# Patient Record
Sex: MALE | Race: WHITE | URBAN - METROPOLITAN AREA
[De-identification: names, ages, dates, MRNs, and addresses within clinical notes are randomized per-mention and may not be internally consistent; named-entity substitution may affect disease eponyms.]

---

## 2017-02-06 ENCOUNTER — MYC MEDICAL ADVICE (OUTPATIENT)
Dept: INTERNAL MEDICINE | Facility: CLINIC | Age: 57
End: 2017-02-06

## 2017-02-06 DIAGNOSIS — N52.2 DRUG-INDUCED ERECTILE DYSFUNCTION: ICD-10-CM

## 2017-02-06 DIAGNOSIS — N40.1 BENIGN NON-NODULAR PROSTATIC HYPERPLASIA WITH LOWER URINARY TRACT SYMPTOMS: Primary | ICD-10-CM

## 2017-02-08 RX ORDER — TADALAFIL 5 MG/1
5 TABLET ORAL DAILY
Qty: 90 TABLET | Refills: 3 | Status: SHIPPED | OUTPATIENT
Start: 2017-02-08 | End: 2017-11-13

## 2017-05-07 ENCOUNTER — MYC MEDICAL ADVICE (OUTPATIENT)
Dept: INTERNAL MEDICINE | Facility: CLINIC | Age: 57
End: 2017-05-07

## 2017-05-07 DIAGNOSIS — F33.42 RECURRENT MAJOR DEPRESSIVE DISORDER, IN FULL REMISSION (H): Primary | ICD-10-CM

## 2017-05-08 RX ORDER — SERTRALINE HYDROCHLORIDE 100 MG/1
200 TABLET, FILM COATED ORAL DAILY
Qty: 180 TABLET | Refills: 1 | Status: SHIPPED | OUTPATIENT
Start: 2017-05-08 | End: 2017-09-08

## 2017-05-09 DIAGNOSIS — N52.2 DRUG-INDUCED ERECTILE DYSFUNCTION: ICD-10-CM

## 2017-05-09 DIAGNOSIS — N40.1 BENIGN NON-NODULAR PROSTATIC HYPERPLASIA WITH LOWER URINARY TRACT SYMPTOMS: ICD-10-CM

## 2017-05-09 RX ORDER — TADALAFIL 5 MG
TABLET ORAL
Qty: 90 TABLET | OUTPATIENT
Start: 2017-05-09

## 2017-08-01 ENCOUNTER — TRANSFERRED RECORDS (OUTPATIENT)
Dept: HEALTH INFORMATION MANAGEMENT | Facility: CLINIC | Age: 57
End: 2017-08-01

## 2017-09-08 ENCOUNTER — OFFICE VISIT (OUTPATIENT)
Dept: INTERNAL MEDICINE | Facility: CLINIC | Age: 57
End: 2017-09-08
Payer: COMMERCIAL

## 2017-09-08 VITALS
TEMPERATURE: 98.5 F | DIASTOLIC BLOOD PRESSURE: 72 MMHG | BODY MASS INDEX: 28.58 KG/M2 | HEIGHT: 72 IN | SYSTOLIC BLOOD PRESSURE: 118 MMHG | WEIGHT: 211 LBS | OXYGEN SATURATION: 97 % | HEART RATE: 100 BPM

## 2017-09-08 DIAGNOSIS — Z00.00 ROUTINE GENERAL MEDICAL EXAMINATION AT A HEALTH CARE FACILITY: Primary | ICD-10-CM

## 2017-09-08 DIAGNOSIS — Z23 NEEDS FLU SHOT: ICD-10-CM

## 2017-09-08 DIAGNOSIS — F33.42 RECURRENT MAJOR DEPRESSIVE DISORDER, IN FULL REMISSION (H): ICD-10-CM

## 2017-09-08 PROCEDURE — 90471 IMMUNIZATION ADMIN: CPT | Performed by: INTERNAL MEDICINE

## 2017-09-08 PROCEDURE — 90686 IIV4 VACC NO PRSV 0.5 ML IM: CPT | Performed by: INTERNAL MEDICINE

## 2017-09-08 PROCEDURE — 99396 PREV VISIT EST AGE 40-64: CPT | Performed by: INTERNAL MEDICINE

## 2017-09-08 RX ORDER — SERTRALINE HYDROCHLORIDE 100 MG/1
200 TABLET, FILM COATED ORAL DAILY
Qty: 180 TABLET | Refills: 3 | Status: SHIPPED | OUTPATIENT
Start: 2017-09-08 | End: 2018-08-28

## 2017-09-08 ASSESSMENT — PATIENT HEALTH QUESTIONNAIRE - PHQ9: SUM OF ALL RESPONSES TO PHQ QUESTIONS 1-9: 0

## 2017-09-08 NOTE — NURSING NOTE
Chief Complaint   Patient presents with     Physical       Initial /72  Pulse 100  Temp 98.5  F (36.9  C) (Oral)  Ht 6' (1.829 m)  Wt 211 lb (95.7 kg)  SpO2 97%  BMI 28.62 kg/m2 Estimated body mass index is 28.62 kg/(m^2) as calculated from the following:    Height as of this encounter: 6' (1.829 m).    Weight as of this encounter: 211 lb (95.7 kg).  Medication Reconciliation: complete   Alejandrina Huynh MA

## 2017-09-08 NOTE — PROGRESS NOTES
SUBJECTIVE:   CC: Tariq Perez is an 56 year old male who presents for preventative health visit.     Healthy Habits:    Do you get at least three servings of calcium containing foods daily (dairy, green leafy vegetables, etc.)? yes    Amount of exercise or daily activities, outside of work: 5 day(s) per week    Problems taking medications regularly No    Medication side effects: No    Have you had an eye exam in the past two years? yes    Do you see a dentist twice per year? Yes, 3 times a year    Do you have sleep apnea, excessive snoring or daytime drowsiness?yes, excessive snoring        PROBLEMS TO ADD ON...  No acute complaints, no medication change or new medical conditions.  Has h/o depression. On medical treatment, controlled, no side effects. No depressive symptoms or suicidal ideation.    Today's PHQ-2 Score: 0  PHQ-2 ( 1999 Pfizer) 9/8/2017 9/16/2016   Q1: Little interest or pleasure in doing things 0 0   Q2: Feeling down, depressed or hopeless 0 0   PHQ-2 Score 0 0   Q1: Little interest or pleasure in doing things - -   Q2: Feeling down, depressed or hopeless - -   PHQ-2 Score - -         Abuse: Current or Past(Physical, Sexual or Emotional)- No  Do you feel safe in your environment - Yes  Social History   Substance Use Topics     Smoking status: Never Smoker     Smokeless tobacco: Never Used     Alcohol use 0.0 oz/week     0 Standard drinks or equivalent per week      Comment: rare occasion     The patient does not drink >3 drinks per day nor >7 drinks per week.    Last PSA:   PSA   Date Value Ref Range Status   09/16/2016 0.86 0 - 4 ug/L Final     Comment:     Assay Method:  Chemiluminescence using Siemens Vista analyzer       Reviewed orders with patient. Reviewed health maintenance and updated orders accordingly - Yes  Labs reviewed in EPIC    Reviewed and updated as needed this visit by clinical staff         Reviewed and updated as needed this visit by Provider              ROS:  C: NEGATIVE  for fever, chills, change in weight  I: NEGATIVE for worrisome rashes, moles or lesions  E: NEGATIVE for vision changes or irritation  ENT: NEGATIVE for ear, mouth and throat problems  R: NEGATIVE for significant cough or SOB  CV: NEGATIVE for chest pain, palpitations or peripheral edema  GI: NEGATIVE for nausea, abdominal pain, heartburn, or change in bowel habits   male: negative for dysuria, hematuria, decreased urinary stream, erectile dysfunction, urethral discharge  M: NEGATIVE for significant arthralgias or myalgia  N: NEGATIVE for weakness, dizziness or paresthesias  P: NEGATIVE for changes in mood or affect    OBJECTIVE:   There were no vitals taken for this visit.  EXAM:  GENERAL: healthy, alert and no distress  EYES: Eyes grossly normal to inspection, PERRL and conjunctivae and sclerae normal  HENT: ear canals and TM's normal, nose and mouth without ulcers or lesions  NECK: no adenopathy, no asymmetry, masses, or scars and thyroid normal to palpation  RESP: lungs clear to auscultation - no rales, rhonchi or wheezes  CV: regular rate and rhythm, normal S1 S2, no S3 or S4, no murmur, click or rub, no peripheral edema and peripheral pulses strong  ABDOMEN: soft, nontender, no hepatosplenomegaly, no masses and bowel sounds normal  MS: no gross musculoskeletal defects noted, no edema  SKIN: no suspicious lesions or rashes  NEURO: Normal strength and tone, mentation intact and speech normal  PSYCH: mentation appears normal, affect normal/bright    ASSESSMENT/PLAN:       ICD-10-CM    1. Routine general medical examination at a health care facility Z00.00 Lipid panel reflex to direct LDL     Prostate spec antigen screen     CBC with platelets     Comprehensive metabolic panel     *UA reflex to Microscopic and Culture ([place] [facility] (except [place] and [place])   2. Recurrent major depressive disorder, in full remission (H) F33.42 sertraline (ZOLOFT) 100 MG tablet       COUNSELING:  Reviewed  "preventive health counseling, as reflected in patient instructions       Regular exercise       Healthy diet/nutrition       Vision screening       Hearing screening       Colon cancer screening       Prostate cancer screening           reports that he has never smoked. He has never used smokeless tobacco.      Estimated body mass index is 27.07 kg/(m^2) as calculated from the following:    Height as of 9/16/16: 6' 0.5\" (1.842 m).    Weight as of 9/16/16: 202 lb 6.4 oz (91.8 kg).   Weight management plan: Discussed healthy diet and exercise guidelines and patient will follow up in 12 months in clinic to re-evaluate.    Counseling Resources:  ATP IV Guidelines  Pooled Cohorts Equation Calculator  FRAX Risk Assessment  ICSI Preventive Guidelines  Dietary Guidelines for Americans, 2010  USDA's MyPlate  ASA Prophylaxis  Lung CA Screening    Saul Peralta MD  [facility]  "

## 2017-09-08 NOTE — MR AVS SNAPSHOT
After Visit Summary   9/8/2017    Tariq Perez    MRN: [id number]           Patient Information     Date Of Birth          1960        Visit Information        Provider Department      9/8/2017 1:40 PM Saul Peralta MD [facility]        Today's Diagnoses     Routine general medical examination at a health care facility    -  1    Recurrent major depressive disorder, in full remission (H)          Care Instructions      Preventive Health Recommendations  Male Ages 50 - 64    Yearly exam:             See your health care provider every year in order to  o   Review health changes.   o   Discuss preventive care.    o   Review your medicines if your doctor has prescribed any.     Have a cholesterol test every 5 years, or more frequently if you are at risk for high cholesterol/heart disease.     Have a diabetes test (fasting glucose) every three years. If you are at risk for diabetes, you should have this test more often.     Have a colonoscopy at age 50, or have a yearly FIT test (stool test). These exams will check for colon cancer.      Talk with your health care provider about whether or not a prostate cancer screening test (PSA) is right for you.    You should be tested each year for STDs (sexually transmitted diseases), if you re at risk.     Shots: Get a flu shot each year. Get a tetanus shot every 10 years.     Nutrition:    Eat at least 5 servings of fruits and vegetables daily.     Eat whole-grain bread, whole-wheat pasta and brown rice instead of white grains and rice.     Talk to your provider about Calcium and Vitamin D.     Lifestyle    Exercise for at least 150 minutes a week (30 minutes a day, 5 days a week). This will help you control your weight and prevent disease.     Limit alcohol to one drink per day.     No smoking.     Wear sunscreen to prevent skin cancer.     See your dentist every six months for an exam and cleaning.     See your eye doctor every 1 to  2 years.            Follow-ups after your visit        Future tests that were ordered for you today     Open Future Orders        Priority Expected Expires Ordered    Lipid panel reflex to direct LDL Routine  11/8/2017 9/8/2017    Prostate spec antigen screen Routine  11/8/2017 9/8/2017    CBC with platelets Routine  11/8/2017 9/8/2017    Comprehensive metabolic panel Routine  11/8/2017 9/8/2017    *UA reflex to Microscopic and Culture ([place] [facility] (except [place] and [place]) Routine  11/8/2017 9/8/2017            Who to contact     If you have questions or need follow up information about today's clinic visit or your schedule please contact [facility] directly at [phone].  Normal or non-critical lab and imaging results will be communicated to you by Freak'[employer] Geniushart, letter or phone within 4 business days after the clinic has received the results. If you do not hear from us within 7 days, please contact the clinic through [employer]iogenixt or phone. If you have a critical or abnormal lab result, we will notify you by phone as soon as possible.  Submit refill requests through Headplay or call your pharmacy and they will forward the refill request to us. Please allow 3 business days for your refill to be completed.          Additional Information About Your Visit        Fre[employer]iushar[employer] Information     Headplay gives you secure access to your electronic health record. If you see a primary care provider, you can also send messages to your care team and make appointments. If you have questions, please call your primary care clinic.  If you do not have a primary care provider, please call [phone] and they will assist you.        Care EveryWhere ID     This is your Care EveryWhere ID. This could be used by other organizations to access your [facility] medical records  [id number]        Your Vitals Were     Pulse Temperature Height Pulse Oximetry BMI (Body Mass Index)       100 98.5  F (36.9  C) (Oral) 6'  (1.829 m) 97% 28.62 kg/m2        Blood Pressure from Last 3 Encounters:   09/08/17 118/72   09/16/16 102/78   09/04/15 116/73    Weight from Last 3 Encounters:   09/08/17 211 lb (95.7 kg)   09/16/16 202 lb 6.4 oz (91.8 kg)   09/04/15 195 lb (88.5 kg)                 Where to get your medicines      These medications were sent to [email or web address] - [address]  [address] [facility] [address]     Phone:  [phone]     sertraline 100 MG tablet          Primary Care Provider Office Phone # Fax #    Saul Peralta [phone]       [address] [facility] [address]        Equal Access to Services     [facility] : Hadii viviana colon hadasho Solydia, waaxda luqadaha, qaybta kaalmada adejohn, jolene díaz . So [facility] [phone].    ATENCIÓN: Si habla español, tiene a mishra disposición servicios gratuitos de asistencia lingüística. Kerry al [phone].    We comply with applicable federal civil rights laws and [place] laws. We do not discriminate on the basis of race, color, national origin, age, disability sex, sexual orientation or gender identity.            Thank you!     Thank you for choosing [facility]  for your care. Our goal is always to provide you with excellent care. Hearing back from our patients is one way we can continue to improve our services. Please take a few minutes to complete the written survey that you may receive in the mail after your visit with us. Thank you!             Your Updated Medication List - Protect others around you: Learn how to safely use, store and throw away your medicines at www.disposemymeds.org.          This list is accurate as of: 9/8/17  2:03 PM.  Always use your most recent med list.                   Brand Name Dispense Instructions for use Diagnosis    ADVIL 200 MG tablet   Generic drug:  ibuprofen      2 TABS PRN        sertraline 100 MG tablet     ZOLOFT    180 tablet    Take 2 tablets (200 mg) by mouth daily    Recurrent major depressive disorder, in full remission (H)       tadalafil 5 MG tablet    CIALIS    90 tablet    Take 1 tablet (5 mg) by mouth daily Never use with nitroglycerin, terazosin or doxazosin.    Benign non-nodular prostatic hyperplasia with lower urinary tract symptoms, Drug-induced erectile dysfunction

## 2017-09-08 NOTE — NURSING NOTE

## 2017-09-30 DIAGNOSIS — Z00.00 ROUTINE GENERAL MEDICAL EXAMINATION AT A HEALTH CARE FACILITY: ICD-10-CM

## 2017-09-30 LAB
ALBUMIN SERPL-MCNC: 3.8 G/DL (ref 3.4–5)
ALBUMIN UR-MCNC: NEGATIVE MG/DL
ALP SERPL-CCNC: 74 U/L (ref 40–150)
ALT SERPL W P-5'-P-CCNC: 27 U/L (ref 0–70)
ANION GAP SERPL CALCULATED.3IONS-SCNC: 6 MMOL/L (ref 3–14)
APPEARANCE UR: CLEAR
AST SERPL W P-5'-P-CCNC: 11 U/L (ref 0–45)
BILIRUB SERPL-MCNC: 0.6 MG/DL (ref 0.2–1.3)
BILIRUB UR QL STRIP: NEGATIVE
BUN SERPL-MCNC: 17 MG/DL (ref 7–30)
CALCIUM SERPL-MCNC: 8.9 MG/DL (ref 8.5–10.1)
CHLORIDE SERPL-SCNC: 104 MMOL/L (ref 94–109)
CHOLEST SERPL-MCNC: 207 MG/DL
CO2 SERPL-SCNC: 29 MMOL/L (ref 20–32)
COLOR UR AUTO: YELLOW
CREAT SERPL-MCNC: 1.06 MG/DL (ref 0.66–1.25)
ERYTHROCYTE [DISTWIDTH] IN BLOOD BY AUTOMATED COUNT: 12.7 % (ref 10–15)
GFR SERPL CREATININE-BSD FRML MDRD: 72 ML/MIN/1.7M2
GLUCOSE SERPL-MCNC: 87 MG/DL (ref 70–99)
GLUCOSE UR STRIP-MCNC: NEGATIVE MG/DL
HCT VFR BLD AUTO: 44 % (ref 40–53)
HDLC SERPL-MCNC: 78 MG/DL
HGB BLD-MCNC: 15.2 G/DL (ref 13.3–17.7)
HGB UR QL STRIP: NEGATIVE
KETONES UR STRIP-MCNC: NEGATIVE MG/DL
LDLC SERPL CALC-MCNC: 117 MG/DL
LEUKOCYTE ESTERASE UR QL STRIP: NEGATIVE
MCH RBC QN AUTO: 31.9 PG (ref 26.5–33)
MCHC RBC AUTO-ENTMCNC: 34.5 G/DL (ref 31.5–36.5)
MCV RBC AUTO: 92 FL (ref 78–100)
NITRATE UR QL: NEGATIVE
NONHDLC SERPL-MCNC: 129 MG/DL
PH UR STRIP: 5 PH (ref 5–7)
PLATELET # BLD AUTO: 175 10E9/L (ref 150–450)
POTASSIUM SERPL-SCNC: 4 MMOL/L (ref 3.4–5.3)
PROT SERPL-MCNC: 7.4 G/DL (ref 6.8–8.8)
PSA SERPL-ACNC: 0.74 UG/L (ref 0–4)
RBC # BLD AUTO: 4.76 10E12/L (ref 4.4–5.9)
SODIUM SERPL-SCNC: 139 MMOL/L (ref 133–144)
SOURCE: NORMAL
SP GR UR STRIP: <=1.005 (ref 1–1.03)
TRIGL SERPL-MCNC: 62 MG/DL
UROBILINOGEN UR STRIP-ACNC: 0.2 EU/DL (ref 0.2–1)
WBC # BLD AUTO: 4.4 10E9/L (ref 4–11)

## 2017-09-30 PROCEDURE — 80053 COMPREHEN METABOLIC PANEL: CPT | Performed by: INTERNAL MEDICINE

## 2017-09-30 PROCEDURE — 80061 LIPID PANEL: CPT | Performed by: INTERNAL MEDICINE

## 2017-09-30 PROCEDURE — 85027 COMPLETE CBC AUTOMATED: CPT | Performed by: INTERNAL MEDICINE

## 2017-09-30 PROCEDURE — 36415 COLL VENOUS BLD VENIPUNCTURE: CPT | Performed by: INTERNAL MEDICINE

## 2017-09-30 PROCEDURE — 81003 URINALYSIS AUTO W/O SCOPE: CPT | Performed by: INTERNAL MEDICINE

## 2017-09-30 PROCEDURE — G0103 PSA SCREENING: HCPCS | Performed by: INTERNAL MEDICINE

## 2017-11-13 ENCOUNTER — MYC MEDICAL ADVICE (OUTPATIENT)
Dept: INTERNAL MEDICINE | Facility: CLINIC | Age: 57
End: 2017-11-13

## 2017-11-13 DIAGNOSIS — N40.1 BENIGN NON-NODULAR PROSTATIC HYPERPLASIA WITH LOWER URINARY TRACT SYMPTOMS: ICD-10-CM

## 2017-11-13 DIAGNOSIS — N52.2 DRUG-INDUCED ERECTILE DYSFUNCTION: ICD-10-CM

## 2017-11-13 RX ORDER — TADALAFIL 5 MG/1
5 TABLET ORAL DAILY
Qty: 90 TABLET | Refills: 2 | Status: SHIPPED | OUTPATIENT
Start: 2017-11-13 | End: 2018-03-19

## 2017-11-15 ENCOUNTER — MYC MEDICAL ADVICE (OUTPATIENT)
Dept: INTERNAL MEDICINE | Facility: CLINIC | Age: 57
End: 2017-11-15

## 2017-11-20 NOTE — TELEPHONE ENCOUNTER
States he is currently getting a 24 day supply with refills but states this is not cost effective. States with the prior auth he is able to get a 90 day supply which is much more cost effective. States he is taking the medication for both ED and bladder issues and this is the only medication that does this so insurance will pay for it. Tried and failed one other medication but does not remember the name. Pt will try to find that info and send a MyChart with info.

## 2017-11-22 NOTE — TELEPHONE ENCOUNTER
PA form completed, MD signed and faxed over.   Will keep encounter open for an update.  Alejandrina Huynh MA

## 2017-11-27 ENCOUNTER — MYC MEDICAL ADVICE (OUTPATIENT)
Dept: INTERNAL MEDICINE | Facility: CLINIC | Age: 57
End: 2017-11-27

## 2017-12-06 ENCOUNTER — MYC MEDICAL ADVICE (OUTPATIENT)
Dept: INTERNAL MEDICINE | Facility: CLINIC | Age: 57
End: 2017-12-06

## 2017-12-07 NOTE — TELEPHONE ENCOUNTER
Message sent to pt advising that we did send in a prescription to [facility]-#90 tabs with 2 refills on 11/13/17. Advised pt to contact [facility] and ask them to re-check on this and to contact us if there is still an issue.

## 2018-03-15 ENCOUNTER — TELEPHONE (OUTPATIENT)
Dept: INTERNAL MEDICINE | Facility: CLINIC | Age: 58
End: 2018-03-15

## 2018-03-15 DIAGNOSIS — N52.2 DRUG-INDUCED ERECTILE DYSFUNCTION: ICD-10-CM

## 2018-03-15 DIAGNOSIS — N40.1 BENIGN NON-NODULAR PROSTATIC HYPERPLASIA WITH LOWER URINARY TRACT SYMPTOMS: ICD-10-CM

## 2018-03-15 NOTE — TELEPHONE ENCOUNTER
Central Prior Authorization Team   Phone: [phone]      PA Not Needed    Medication: tadalafil (CIALIS) 5 MG tablet - PA not needed  Insurance Company: [employer] - Phone [phone] Fax [phone]  Pharmacy Filling the Rx: [facility] - [place], [place] - [address] [place] [address]  Start Date: 3/15/2018    Tried to initiate quantity limit exception by phone and was told it was already approved. The did run a test claim for the 90day supply with a quantity of 90 that the patient wanted and it did go through. Then called and spoke with pharmacy and the script on file is only written for a 24day supply. They need a new prescription written for a quantity of 90. Thanks!

## 2018-03-19 RX ORDER — TADALAFIL 5 MG/1
5 TABLET ORAL DAILY
Qty: 90 TABLET | Refills: 1 | Status: SHIPPED | OUTPATIENT
Start: 2018-03-19 | End: 2018-08-28

## 2018-08-26 DIAGNOSIS — N40.1 BENIGN NON-NODULAR PROSTATIC HYPERPLASIA WITH LOWER URINARY TRACT SYMPTOMS: ICD-10-CM

## 2018-08-26 DIAGNOSIS — N52.2 DRUG-INDUCED ERECTILE DYSFUNCTION: ICD-10-CM

## 2018-08-26 RX ORDER — TADALAFIL 5 MG
TABLET ORAL
Qty: 90 TABLET | Refills: 1 | Status: CANCELLED | OUTPATIENT
Start: 2018-08-26

## 2018-08-27 NOTE — TELEPHONE ENCOUNTER
"Requested Prescriptions   Pending Prescriptions Disp Refills     CIALIS 5 MG tablet [Pharmacy Med Name: CIALIS TABS 5MG] 90 tablet 1    Last Written Prescription Date:  03/19/2018  Last Fill Quantity: 90,  # refills: 1   Last office visit: 9/8/2017 with prescribing provider:     Future Office Visit:   Sig: TAKE 1 TABLET DAILY (NEVER USE WITH NITROGLYCERIN, TERAZOSIN OR DOXAZOSIN)    Erectile Dysfuction Protocol Passed    8/26/2018  6:31 AM       Passed - Absence of nitrates on medication list       Passed - Absence of Alpha Blockers on Med list       Passed - Recent (12 mo) or future (30 days) visit within the authorizing provider's specialty    Patient had office visit in the last 12 months or has a visit in the next 30 days with authorizing provider or within the authorizing provider's specialty.  See \"Patient Info\" tab in inbasket, or \"Choose Columns\" in Meds & Orders section of the refill encounter.           Passed - Patient is age 18 or older        "

## 2018-08-28 ENCOUNTER — OFFICE VISIT (OUTPATIENT)
Dept: INTERNAL MEDICINE | Facility: CLINIC | Age: 58
End: 2018-08-28
Payer: COMMERCIAL

## 2018-08-28 VITALS
HEIGHT: 72 IN | DIASTOLIC BLOOD PRESSURE: 80 MMHG | WEIGHT: 214.4 LBS | TEMPERATURE: 98.2 F | SYSTOLIC BLOOD PRESSURE: 118 MMHG | BODY MASS INDEX: 29.04 KG/M2 | RESPIRATION RATE: 20 BRPM | HEART RATE: 88 BPM | OXYGEN SATURATION: 97 %

## 2018-08-28 DIAGNOSIS — F33.42 RECURRENT MAJOR DEPRESSIVE DISORDER, IN FULL REMISSION (H): ICD-10-CM

## 2018-08-28 DIAGNOSIS — N40.1 BENIGN NON-NODULAR PROSTATIC HYPERPLASIA WITH LOWER URINARY TRACT SYMPTOMS: ICD-10-CM

## 2018-08-28 DIAGNOSIS — Z00.00 ROUTINE GENERAL MEDICAL EXAMINATION AT A HEALTH CARE FACILITY: Primary | ICD-10-CM

## 2018-08-28 DIAGNOSIS — Z12.5 SCREENING FOR PROSTATE CANCER: ICD-10-CM

## 2018-08-28 DIAGNOSIS — N52.2 DRUG-INDUCED ERECTILE DYSFUNCTION: ICD-10-CM

## 2018-08-28 LAB
ALBUMIN SERPL-MCNC: 3.8 G/DL (ref 3.4–5)
ALBUMIN UR-MCNC: NEGATIVE MG/DL
ALP SERPL-CCNC: 75 U/L (ref 40–150)
ALT SERPL W P-5'-P-CCNC: 29 U/L (ref 0–70)
ANION GAP SERPL CALCULATED.3IONS-SCNC: 1 MMOL/L (ref 3–14)
APPEARANCE UR: CLEAR
AST SERPL W P-5'-P-CCNC: 20 U/L (ref 0–45)
BILIRUB SERPL-MCNC: 0.5 MG/DL (ref 0.2–1.3)
BILIRUB UR QL STRIP: NEGATIVE
BUN SERPL-MCNC: 22 MG/DL (ref 7–30)
CALCIUM SERPL-MCNC: 8.7 MG/DL (ref 8.5–10.1)
CHLORIDE SERPL-SCNC: 105 MMOL/L (ref 94–109)
CHOLEST SERPL-MCNC: 216 MG/DL
CO2 SERPL-SCNC: 33 MMOL/L (ref 20–32)
COLOR UR AUTO: YELLOW
CREAT SERPL-MCNC: 1.14 MG/DL (ref 0.66–1.25)
ERYTHROCYTE [DISTWIDTH] IN BLOOD BY AUTOMATED COUNT: 12.6 % (ref 10–15)
GFR SERPL CREATININE-BSD FRML MDRD: 66 ML/MIN/1.7M2
GLUCOSE SERPL-MCNC: 84 MG/DL (ref 70–99)
GLUCOSE UR STRIP-MCNC: NEGATIVE MG/DL
HCT VFR BLD AUTO: 44 % (ref 40–53)
HDLC SERPL-MCNC: 69 MG/DL
HGB BLD-MCNC: 14.9 G/DL (ref 13.3–17.7)
HGB UR QL STRIP: NEGATIVE
KETONES UR STRIP-MCNC: NEGATIVE MG/DL
LDLC SERPL CALC-MCNC: 138 MG/DL
LEUKOCYTE ESTERASE UR QL STRIP: NEGATIVE
MCH RBC QN AUTO: 31.7 PG (ref 26.5–33)
MCHC RBC AUTO-ENTMCNC: 33.9 G/DL (ref 31.5–36.5)
MCV RBC AUTO: 94 FL (ref 78–100)
NITRATE UR QL: NEGATIVE
NONHDLC SERPL-MCNC: 147 MG/DL
PH UR STRIP: 6.5 PH (ref 5–7)
PLATELET # BLD AUTO: 176 10E9/L (ref 150–450)
POTASSIUM SERPL-SCNC: 4.1 MMOL/L (ref 3.4–5.3)
PROT SERPL-MCNC: 7.2 G/DL (ref 6.8–8.8)
PSA SERPL-ACNC: 0.83 UG/L (ref 0–4)
RBC # BLD AUTO: 4.7 10E12/L (ref 4.4–5.9)
SODIUM SERPL-SCNC: 139 MMOL/L (ref 133–144)
SOURCE: NORMAL
SP GR UR STRIP: 1.01 (ref 1–1.03)
TRIGL SERPL-MCNC: 43 MG/DL
TSH SERPL DL<=0.005 MIU/L-ACNC: 1.5 MU/L (ref 0.4–4)
UROBILINOGEN UR STRIP-ACNC: 0.2 EU/DL (ref 0.2–1)
WBC # BLD AUTO: 4.4 10E9/L (ref 4–11)

## 2018-08-28 PROCEDURE — G0103 PSA SCREENING: HCPCS | Performed by: INTERNAL MEDICINE

## 2018-08-28 PROCEDURE — 85027 COMPLETE CBC AUTOMATED: CPT | Performed by: INTERNAL MEDICINE

## 2018-08-28 PROCEDURE — 84443 ASSAY THYROID STIM HORMONE: CPT | Performed by: INTERNAL MEDICINE

## 2018-08-28 PROCEDURE — 80061 LIPID PANEL: CPT | Performed by: INTERNAL MEDICINE

## 2018-08-28 PROCEDURE — 99396 PREV VISIT EST AGE 40-64: CPT | Performed by: INTERNAL MEDICINE

## 2018-08-28 PROCEDURE — 81003 URINALYSIS AUTO W/O SCOPE: CPT | Performed by: INTERNAL MEDICINE

## 2018-08-28 PROCEDURE — 80053 COMPREHEN METABOLIC PANEL: CPT | Performed by: INTERNAL MEDICINE

## 2018-08-28 PROCEDURE — 36415 COLL VENOUS BLD VENIPUNCTURE: CPT | Performed by: INTERNAL MEDICINE

## 2018-08-28 RX ORDER — SERTRALINE HYDROCHLORIDE 100 MG/1
150 TABLET, FILM COATED ORAL DAILY
Qty: 135 TABLET | Refills: 3 | Status: SHIPPED | OUTPATIENT
Start: 2018-08-28 | End: 2018-12-10

## 2018-08-28 RX ORDER — TADALAFIL 5 MG/1
5 TABLET ORAL DAILY
Qty: 90 TABLET | Refills: 3 | Status: SHIPPED | OUTPATIENT
Start: 2018-08-28 | End: 2019-10-21

## 2018-08-28 NOTE — PROGRESS NOTES
SUBJECTIVE:   CC: Tariq Perez is an 57 year old male who presents for preventative health visit.     Healthy Habits:    Do you get at least three servings of calcium containing foods daily (dairy, green leafy vegetables, etc.)? yes    Amount of exercise or daily activities, outside of work: 3-5 day(s) per week    Problems taking medications regularly No    Medication side effects: Yes, sertraline.    Have you had an eye exam in the past two years? yes    Do you see a dentist twice per year? yes    Do you have sleep apnea, excessive snoring or daytime drowsiness?no       Wants blood test to includes the liver.    Today's PHQ-2 Score:   PHQ-2 ( 1999 Pfizer) 8/28/2018 9/8/2017   Q1: Little interest or pleasure in doing things 0 0   Q2: Feeling down, depressed or hopeless 0 0   PHQ-2 Score 0 0   Q1: Little interest or pleasure in doing things - -   Q2: Feeling down, depressed or hopeless - -   PHQ-2 Score - -       Abuse: Current or Past(Physical, Sexual or Emotional)- No  Do you feel safe in your environment - Yes    Social History   Substance Use Topics     Smoking status: Never Smoker     Smokeless tobacco: Never Used     Alcohol use 0.0 oz/week     0 Standard drinks or equivalent per week      Comment: rare occasion      If you drink alcohol do you typically have >3 drinks per day or >7 drinks per week? Yes - AUDIT SCORE:     Occasional.                      Last PSA:   PSA   Date Value Ref Range Status   09/30/2017 0.74 0 - 4 ug/L Final     Comment:     Assay Method:  Chemiluminescence using Siemens Vista analyzer       Reviewed orders with patient. Reviewed health maintenance and updated orders accordingly - Yes  Labs reviewed in EPIC  BP Readings from Last 3 Encounters:   08/28/18 118/80   09/08/17 118/72   09/16/16 102/78    Wt Readings from Last 3 Encounters:   08/28/18 214 lb 6.4 oz (97.3 kg)   09/08/17 211 lb (95.7 kg)   09/16/16 202 lb 6.4 oz (91.8 kg)                  Patient Active Problem List    Diagnosis     CARDIOVASCULAR SCREENING; LDL GOAL LESS THAN 160     GERD (gastroesophageal reflux disease)     Recurrent major depressive disorder, in full remission (H)     Past Surgical History:   Procedure Laterality Date     COLONOSCOPY  3/3/2012    Procedure:COLONOSCOPY; COLONOSCOPY; Surgeon:AL DIXON; Location:[facility]     DECOMPRESSION LUMBAR ONE LEVEL       TONSILLECTOMY         Social History   Substance Use Topics     Smoking status: Never Smoker     Smokeless tobacco: Never Used     Alcohol use 0.0 oz/week     0 Standard drinks or equivalent per week      Comment: rare occasion     Family History   Problem Relation Age of Onset     GASTROINTESTINAL DISEASE Mother      b:1931 gastric reflux     Depression Father      d:age 76 manic depression     Depression Sister      b:1964         Current Outpatient Prescriptions   Medication Sig Dispense Refill     ADVIL 200 MG OR TABS 2 TABS PRN       sertraline (ZOLOFT) 100 MG tablet Take 1.5 tablets (150 mg) by mouth daily 135 tablet 3     tadalafil (CIALIS) 5 MG tablet Take 1 tablet (5 mg) by mouth daily Never use with nitroglycerin, terazosin or doxazosin. 90 tablet 3     [DISCONTINUED] sertraline (ZOLOFT) 100 MG tablet Take 2 tablets (200 mg) by mouth daily 180 tablet 3     [DISCONTINUED] tadalafil (CIALIS) 5 MG tablet Take 1 tablet (5 mg) by mouth daily Never use with nitroglycerin, terazosin or doxazosin. 90 tablet 1       Reviewed and updated as needed this visit by clinical staff  Tobacco  Allergies  Meds  Med Hx  Surg Hx  Fam Hx  Soc Hx        Reviewed and updated as needed this visit by Provider        Past Medical History:   Diagnosis Date     Depression       Has h/o depression. On medical treatment, controlled, no side effects. No depressive symptoms or suicidal ideation.    Has H/O ED and BPH. On treatment with Cialis . No  symptoms of frequency, decreased urinary stream or dysuria. No side effects from medications.    ROS:  CONSTITUTIONAL:  NEGATIVE for fever, chills, change in weight  INTEGUMENTARY/SKIN: NEGATIVE for worrisome rashes, moles or lesions  EYES: NEGATIVE for vision changes or irritation  ENT: NEGATIVE for ear, mouth and throat problems  RESP: NEGATIVE for significant cough or SOB  CV: NEGATIVE for chest pain, palpitations or peripheral edema  GI: NEGATIVE for nausea, abdominal pain, heartburn, or change in bowel habits   male: negative for dysuria, hematuria, decreased urinary stream, erectile dysfunction, urethral discharge  MUSCULOSKELETAL: NEGATIVE for significant arthralgias or myalgia  NEURO: NEGATIVE for weakness, dizziness or paresthesias  PSYCHIATRIC: NEGATIVE for changes in mood or affect    OBJECTIVE:   /80 (BP Location: Left arm, Patient Position: Sitting, Cuff Size: Adult Regular)  Pulse 88  Temp 98.2  F (36.8  C) (Oral)  Resp 20  Ht 6' (1.829 m)  Wt 214 lb 6.4 oz (97.3 kg)  SpO2 97%  BMI 29.08 kg/m2  EXAM:  GENERAL: healthy, alert and no distress  EYES: Eyes grossly normal to inspection, PERRL and conjunctivae and sclerae normal  HENT: ear canals and TM's normal, nose and mouth without ulcers or lesions  NECK: no adenopathy, no asymmetry, masses, or scars and thyroid normal to palpation  RESP: lungs clear to auscultation - no rales, rhonchi or wheezes  CV: regular rate and rhythm, normal S1 S2, no S3 or S4, no murmur, click or rub, no peripheral edema and peripheral pulses strong  ABDOMEN: soft, nontender, no hepatosplenomegaly, no masses and bowel sounds normal  MS: no gross musculoskeletal defects noted, no edema  SKIN: no suspicious lesions or rashes  NEURO: Normal strength and tone, mentation intact and speech normal  PSYCH: mentation appears normal, affect normal/bright    Diagnostic Test Results:  none     ASSESSMENT/PLAN:       ICD-10-CM    1. Routine general medical examination at a health care facility Z00.00 CBC with platelets     Comprehensive metabolic panel     Lipid panel reflex to direct LDL  Fasting     Prostate spec antigen screen     TSH with free T4 reflex     *UA reflex to Microscopic   2. Benign non-nodular prostatic hyperplasia with lower urinary tract symptoms N40.1 tadalafil (CIALIS) 5 MG tablet   3. Drug-induced erectile dysfunction N52.2 tadalafil (CIALIS) 5 MG tablet   4. Recurrent major depressive disorder, in full remission (H) F33.42 sertraline (ZOLOFT) 100 MG tablet   5. Screening for prostate cancer Z12.5 Prostate spec antigen screen       COUNSELING:  Reviewed preventive health counseling, as reflected in patient instructions       Regular exercise       Healthy diet/nutrition       Vision screening       Hearing screening       Colon cancer screening       Prostate cancer screening    BP Readings from Last 1 Encounters:   08/28/18 118/80     Estimated body mass index is 29.08 kg/(m^2) as calculated from the following:    Height as of this encounter: 6' (1.829 m).    Weight as of this encounter: 214 lb 6.4 oz (97.3 kg).      Decrease Sertraline to 150 mg and if tolerated can change to 100 mg in 3 months      reports that he has never smoked. He has never used smokeless tobacco.      Counseling Resources:  ATP IV Guidelines  Pooled Cohorts Equation Calculator  FRAX Risk Assessment  ICSI Preventive Guidelines  Dietary Guidelines for Americans, 2010  USDA's MyPlate  ASA Prophylaxis  Lung CA Screening    Saul Peralta MD  [facility]

## 2018-08-28 NOTE — MR AVS SNAPSHOT
After Visit Summary   8/28/2018    Tariq Perez    MRN: [id number]           Patient Information     Date Of Birth          1960        Visit Information        Provider Department      8/28/2018 8:20 AM Saul Peralta MD [facility]        Today's Diagnoses     Routine general medical examination at a health care facility    -  1    Benign non-nodular prostatic hyperplasia with lower urinary tract symptoms        Drug-induced erectile dysfunction        Recurrent major depressive disorder, in full remission (H)        Screening for prostate cancer          Care Instructions      Preventive Health Recommendations  Male Ages 50 - 64    Yearly exam:             See your health care provider every year in order to  o   Review health changes.   o   Discuss preventive care.    o   Review your medicines if your doctor has prescribed any.     Have a cholesterol test every 5 years, or more frequently if you are at risk for high cholesterol/heart disease.     Have a diabetes test (fasting glucose) every three years. If you are at risk for diabetes, you should have this test more often.     Have a colonoscopy at age 50, or have a yearly FIT test (stool test). These exams will check for colon cancer.      Talk with your health care provider about whether or not a prostate cancer screening test (PSA) is right for you.    You should be tested each year for STDs (sexually transmitted diseases), if you re at risk.     Shots: Get a flu shot each year. Get a tetanus shot every 10 years.     Nutrition:    Eat at least 5 servings of fruits and vegetables daily.     Eat whole-grain bread, whole-wheat pasta and brown rice instead of white grains and rice.     Get adequate Calcium and Vitamin D.     Lifestyle    Exercise for at least 150 minutes a week (30 minutes a day, 5 days a week). This will help you control your weight and prevent disease.     Limit alcohol to one drink per day.     No  smoking.     Wear sunscreen to prevent skin cancer.     See your dentist every six months for an exam and cleaning.     See your eye doctor every 1 to 2 years.            Follow-ups after your visit        Who to contact     If you have questions or need follow up information about today's clinic visit or your schedule please contact [facility] directly at [phone].  Normal or non-critical lab and imaging results will be communicated to you by MyChart, letter or phone within 4 business days after the clinic has received the results. If you do not hear from us within 7 days, please contact the clinic through Mocanat or phone. If you have a critical or abnormal lab result, we will notify you by phone as soon as possible.  Submit refill requests through Cloopen or call your pharmacy and they will forward the refill request to us. Please allow 3 business days for your refill to be completed.          Additional Information About Your Visit        The 3DoodlerharEasy Voyage Information     Cloopen gives you secure access to your electronic health record. If you see a primary care provider, you can also send messages to your care team and make appointments. If you have questions, please call your primary care clinic.  If you do not have a primary care provider, please call [phone] and they will assist you.        Care EveryWhere ID     This is your Care EveryWhere ID. This could be used by other organizations to access your [facility] medical records  [id number]        Your Vitals Were     Pulse Temperature Respirations Height Pulse Oximetry BMI (Body Mass Index)    88 98.2  F (36.8  C) (Oral) 20 6' (1.829 m) 97% 29.08 kg/m2       Blood Pressure from Last 3 Encounters:   08/28/18 118/80   09/08/17 118/72   09/16/16 102/78    Weight from Last 3 Encounters:   08/28/18 214 lb 6.4 oz (97.3 kg)   09/08/17 211 lb (95.7 kg)   09/16/16 202 lb 6.4 oz (91.8 kg)              We Performed the Following     *UA reflex to  Microscopic     CBC with platelets     Comprehensive metabolic panel     Lipid panel reflex to direct LDL Fasting     Prostate spec antigen screen     TSH with free T4 reflex          Today's Medication Changes          These changes are accurate as of 8/28/18  8:41 AM.  If you have any questions, ask your nurse or doctor.               These medicines have changed or have updated prescriptions.        Dose/Directions    sertraline 100 MG tablet   Commonly known as:  ZOLOFT   This may have changed:  how much to take   Used for:  Recurrent major depressive disorder, in full remission (H)   Changed by:  Saul Peralta MD        Dose:  150 mg   Take 1.5 tablets (150 mg) by mouth daily   Quantity:  135 tablet   Refills:  3            Where to get your medicines      These medications were sent to [facility] - [address] [address]     Phone:  [phone]     sertraline 100 MG tablet    tadalafil 5 MG tablet                Primary Care Provider Office Phone # Fax #    Saul Peralta [phone]       [address] [address]        Equal Access to Services     [facility]: Hadii aad ku hadasho Soomaali, waaxda luqadaha, qaybta kaalmada adeegyacelestina, jolene díaz . So [facility] [phone].    ATENCIÓN: Si habla español, tiene a mishra disposición servicios gratuitos de asistencia lingüística. Llame al [phone].    We comply with applicable federal civil rights laws and [place] laws. We do not discriminate on the basis of race, color, national origin, age, disability, sex, sexual orientation, or gender identity.            Thank you!     Thank you for choosing [facility]  for your care. Our goal is always to provide you with excellent care. Hearing back from our patients is one way we can continue to improve our services. Please take a few minutes to complete  the written survey that you may receive in the mail after your visit with us. Thank you!             Your Updated Medication List - Protect others around you: Learn how to safely use, store and throw away your medicines at www.disposemymeds.org.          This list is accurate as of 8/28/18  8:41 AM.  Always use your most recent med list.                   Brand Name Dispense Instructions for use Diagnosis    ADVIL 200 MG tablet   Generic drug:  ibuprofen      2 TABS PRN        sertraline 100 MG tablet    ZOLOFT    135 tablet    Take 1.5 tablets (150 mg) by mouth daily    Recurrent major depressive disorder, in full remission (H)       tadalafil 5 MG tablet    CIALIS    90 tablet    Take 1 tablet (5 mg) by mouth daily Never use with nitroglycerin, terazosin or doxazosin.    Benign non-nodular prostatic hyperplasia with lower urinary tract symptoms, Drug-induced erectile dysfunction

## 2018-08-28 NOTE — NURSING NOTE
Vital signs:  Temp: 98.2  F (36.8  C) Temp src: Oral BP: 118/80 Pulse: 88   Resp: 20 SpO2: 97 %     Height: 6' (182.9 cm) Weight: 214 lb 6.4 oz (97.3 kg)  Estimated body mass index is 29.08 kg/(m^2) as calculated from the following:    Height as of this encounter: 6' (1.829 m).    Weight as of this encounter: 214 lb 6.4 oz (97.3 kg).

## 2018-08-28 NOTE — LETTER
August 29, 2018      Tariq Perez  [address] [place] [id number]        Dear ,    We are writing to inform you of your test results.    Normal result reviewed, please notify patient.  Slightly elevated cholesterol. Keep diet and exercise.    Resulted Orders   CBC with platelets   Result Value Ref Range    WBC 4.4 4.0 - 11.0 10e9/L    RBC Count 4.70 4.4 - 5.9 10e12/L    Hemoglobin 14.9 13.3 - 17.7 g/dL    Hematocrit 44.0 40.0 - 53.0 %    MCV 94 78 - 100 fl    MCH 31.7 26.5 - 33.0 pg    MCHC 33.9 31.5 - 36.5 g/dL    RDW 12.6 10.0 - 15.0 %    Platelet Count 176 150 - 450 10e9/L   Comprehensive metabolic panel   Result Value Ref Range    Sodium 139 133 - 144 mmol/L    Potassium 4.1 3.4 - 5.3 mmol/L    Chloride 105 94 - 109 mmol/L    Carbon Dioxide 33 (H) 20 - 32 mmol/L    Anion Gap 1 (L) 3 - 14 mmol/L    Glucose 84 70 - 99 mg/dL      Comment:      Fasting specimen    Urea Nitrogen 22 7 - 30 mg/dL    Creatinine 1.14 0.66 - 1.25 mg/dL    GFR Estimate 66 >60 mL/min/1.7m2      Comment:      Non  GFR Calc    GFR Estimate If Black 80 >60 mL/min/1.7m2      Comment:       GFR Calc    Calcium 8.7 8.5 - 10.1 mg/dL    Bilirubin Total 0.5 0.2 - 1.3 mg/dL    Albumin 3.8 3.4 - 5.0 g/dL    Protein Total 7.2 6.8 - 8.8 g/dL    Alkaline Phosphatase 75 40 - 150 U/L    ALT 29 0 - 70 U/L    AST 20 0 - 45 U/L   Lipid panel reflex to direct LDL Fasting   Result Value Ref Range    Cholesterol 216 (H) <200 mg/dL      Comment:      Desirable:       <200 mg/dl    Triglycerides 43 <150 mg/dL      Comment:      Fasting specimen    HDL Cholesterol 69 >39 mg/dL    LDL Cholesterol Calculated 138 (H) <100 mg/dL      Comment:      Above desirable:  100-129 mg/dl  Borderline High:  130-159 mg/dL  High:             160-189 mg/dL  Very high:       >189 mg/dl      Non HDL Cholesterol 147 (H) <130 mg/dL      Comment:      Above Desirable:  130-159 mg/dl  Borderline high:  160-189 mg/dl  High:              190-219 mg/dl  Very high:       >219 mg/dl     Prostate spec antigen screen   Result Value Ref Range    PSA 0.83 0 - 4 ug/L      Comment:      Assay Method:  Chemiluminescence using Siemens Vista analyzer   TSH with free T4 reflex   Result Value Ref Range    TSH 1.50 0.40 - 4.00 mU/L   *UA reflex to Microscopic   Result Value Ref Range    Color Urine Yellow     Appearance Urine Clear     Glucose Urine Negative NEG^Negative mg/dL    Bilirubin Urine Negative NEG^Negative    Ketones Urine Negative NEG^Negative mg/dL    Specific Gravity Urine 1.010 1.003 - 1.035    Blood Urine Negative NEG^Negative    pH Urine 6.5 5.0 - 7.0 pH    Protein Albumin Urine Negative NEG^Negative mg/dL    Urobilinogen Urine 0.2 0.2 - 1.0 EU/dL    Nitrite Urine Negative NEG^Negative    Leukocyte Esterase Urine Negative NEG^Negative    Source Midstream Urine        If you have any questions or concerns, please call the clinic at the number listed above.       Sincerely,        Saul Peralta MD

## 2018-09-08 ENCOUNTER — MYC MEDICAL ADVICE (OUTPATIENT)
Dept: INTERNAL MEDICINE | Facility: CLINIC | Age: 58
End: 2018-09-08

## 2018-09-10 NOTE — TELEPHONE ENCOUNTER
See his my chart message.     He is concerned about the use of Sertraline dose and oral Terbinafine together.   Cannot find any interaction through Aniwaysedex.     Please advise.

## 2018-09-26 ENCOUNTER — OFFICE VISIT (OUTPATIENT)
Dept: PODIATRY | Facility: CLINIC | Age: 58
End: 2018-09-26
Payer: COMMERCIAL

## 2018-09-26 VITALS
BODY MASS INDEX: 28.99 KG/M2 | DIASTOLIC BLOOD PRESSURE: 70 MMHG | HEIGHT: 72 IN | SYSTOLIC BLOOD PRESSURE: 120 MMHG | WEIGHT: 214 LBS

## 2018-09-26 DIAGNOSIS — B35.1 ONYCHOMYCOSIS OF TOENAIL: Primary | ICD-10-CM

## 2018-09-26 PROCEDURE — 99203 OFFICE O/P NEW LOW 30 MIN: CPT | Performed by: PODIATRIST

## 2018-09-26 RX ORDER — TERBINAFINE HYDROCHLORIDE 250 MG/1
250 TABLET ORAL DAILY
Qty: 90 TABLET | Refills: 1 | Status: SHIPPED | OUTPATIENT
Start: 2018-09-26 | End: 2018-12-21

## 2018-09-26 RX ORDER — KETOCONAZOLE 20 MG/G
CREAM TOPICAL DAILY
Qty: 30 G | Refills: 1 | Status: SHIPPED | OUTPATIENT
Start: 2018-09-26 | End: 2020-10-12

## 2018-09-26 NOTE — PATIENT INSTRUCTIONS
Thank you for choosing [facility]!    DR. DSOUZA'S CLINIC SCHEDULE  MONDAY AM - [place] TUESDAY - [place]   [address] [address] [address] [address] [address] [place] [address]   [phone] / [phone] / [phone]       WEDNESDAY - [place] FRIDAY AM - WOUND CENTER   [address]   [place] [address] [place] [address]   [phone] / [phone]       FRIDAY PM - [place] SCHEDULE SURGERY: [phone]   [address] BILLING QUESTIONS: [phone]   [place] [address] AFTER HOURS: [phone] / [phone] APPOINTMENTS: [phone]     Consumer Price Line (CPL) [phone]       NAIL FUNGUS / ONYCHOMYCOSIS   Nail fungus is not a hygiene problem and will not likely lead to significant medical   problems. The nails may get thick causing pain and possibly local skin infection.   Treatments include debridement (trimming), oral antifungals, topical antifungals and complete removal of the nail. Most fungal nails are not treated.   Topicals such as tea tree oil can be helpful for surface fungus and may, at best, limit   progression. Over the counter creams (such as Lamisil) can also be used however, their effectiveness is also quite low.  Topical treatment with Pen lac is expensive and often not covered by insurance. Pen lac has an approximate 8% success rate. Topical therapy recommendations is to apply twice a day for at least 3-4 months as it takes 9 months for new nail to grow out.    Experts suggest soaking your feet for 15 to 20 minutes in a mixture of 1 cup vinegar to 4 cups warm water. Be sure to rinse well and pat your feet dry when you're done. You can soak your feet like this daily. But if your skin becomes irritated, try soaking only two to three times a week. Vicks VapoRub, as with vinegar, there have been no controlled clinical trials to assess the  effectiveness of Vicks VapoRub on nail fungus, but there have been numerous anecdotal reports that it works. There's no consensus on how often to apply this product, so check with your doctor before using it on your nails.     Oral therapies include Sporanox and Lamisil. Oral therapies are also expensive and not very effective. Side effects such as liver disease are the main concern. Return of fungus is common even if the treatment worked.     Other Tips:  - Penlac nail medication apply daily x 4 months; remove old polish first day of each week  - Antifungal cream/powder (Zeasorb) - apply daily to feet and shoes x 2 months  - Clean shoes with Lysol or in washing machine every few weeks  - Rotate shoe gear; give them 24 hours to dry out between days wearing them  - Clean pair of socks in morning, clean pair in afternoon if your feet sweat  - Shower shoes used in public showers/pools      Body Mass Index (BMI)  Many things can cause foot and ankle problems. Foot structure, activity level, foot mechanics and injuries are common causes of pain.  One very important issue that often goes unmentioned, is body weight. Extra weight can cause increased stress on muscles, ligaments, bones and tendons.  Sometimes just a few extra pounds is all it takes to put one over her/his threshold. Without reducing that stress, it can be difficult to alleviate pain. Some people are uncomfortable addressing this issue, but we feel it is important for you to think about it. As Foot &  Ankle specialists, our job is addressing the lower extremity problem and possible causes. Regarding extra body weight, we encourage patients to discuss diet and weight management plans with their primary care doctors. It is this team approach that gives you the best opportunity for pain relief and getting you back on your feet.

## 2018-09-26 NOTE — MR AVS SNAPSHOT
After Visit Summary   9/26/2018    Tariq Perez    MRN: [id number]           Patient Information     Date Of Birth          1960        Visit Information        Provider Department      9/26/2018 10:00 AM Arlene Grubbs DPM, Podiatry/Foot and Ankle Surgery [facility] [place]        Care Instructions    Thank you for choosing [facility]!    DR. GRUBBS'S CLINIC SCHEDULE  MONDAY AM - [place] TUESDAY - [place]   [address] [address] [place] [address] [place] [address]   [phone] / [phone] / [phone]       WEDNESDAY - [place] FRIDAY AM - WOUND CENTER   [address]   [place] [address] [place] [address]   [phone] / [phone]       FRIDAY PM - [place] SCHEDULE SURGERY: [phone]   [address] BILLING QUESTIONS: [phone]   [place] [address] AFTER HOURS: [phone] / [phone] APPOINTMENTS: [phone]     Consumer Price Line (CPL) [phone]       NAIL FUNGUS / ONYCHOMYCOSIS   Nail fungus is not a hygiene problem and will not likely lead to significant medical   problems. The nails may get thick causing pain and possibly local skin infection.   Treatments include debridement (trimming), oral antifungals, topical antifungals and complete removal of the nail. Most fungal nails are not treated.   Topicals such as tea tree oil can be helpful for surface fungus and may, at best, limit   progression. Over the counter creams (such as Lamisil) can also be used however, their effectiveness is also quite low.  Topical treatment with Pen lac is expensive and often not covered by insurance. Pen lac has an approximate 8% success rate. Topical therapy recommendations is to apply twice a day for at least 3-4 months as it takes 9 months for new nail to grow out.    Experts suggest soaking your feet for 15 to 20 minutes  in a mixture of 1 cup vinegar to 4 cups warm water. Be sure to rinse well and pat your feet dry when you're done. You can soak your feet like this daily. But if your skin becomes irritated, try soaking only two to three times a week. Vicks VapoRub, as with vinegar, there have been no controlled clinical trials to assess the effectiveness of Vicks VapoRub on nail fungus, but there have been numerous anecdotal reports that it works. There's no consensus on how often to apply this product, so check with your doctor before using it on your nails.     Oral therapies include Sporanox and Lamisil. Oral therapies are also expensive and not very effective. Side effects such as liver disease are the main concern. Return of fungus is common even if the treatment worked.     Other Tips:  - Penlac nail medication apply daily x 4 months; remove old polish first day of each week  - Antifungal cream/powder (Zeasorb) - apply daily to feet and shoes x 2 months  - Clean shoes with Lysol or in washing machine every few weeks  - Rotate shoe gear; give them 24 hours to dry out between days wearing them  - Clean pair of socks in morning, clean pair in afternoon if your feet sweat  - Shower shoes used in public showers/pools      Body Mass Index (BMI)  Many things can cause foot and ankle problems. Foot structure, activity level, foot mechanics and injuries are common causes of pain.  One very important issue that often goes unmentioned, is body weight. Extra weight can cause increased stress on muscles, ligaments, bones and tendons.  Sometimes just a few extra pounds is all it takes to put one over her/his threshold. Without reducing that stress, it can be difficult to alleviate pain. Some people are uncomfortable addressing this issue, but we feel it is important for you to think about it. As Foot &  Ankle specialists, our job is addressing the lower extremity problem and possible causes. Regarding extra body weight, we encourage  patients to discuss diet and weight management plans with their primary care doctors. It is this team approach that gives you the best opportunity for pain relief and getting you back on your feet.                  Follow-ups after your visit        Who to contact     If you have questions or need follow up information about today's clinic visit or your schedule please contact [facility] directly at [phone].  Normal or non-critical lab and imaging results will be communicated to you by BeQuanhart, letter or phone within 4 business days after the clinic has received the results. If you do not hear from us within 7 days, please contact the clinic through BeQuanhart or phone. If you have a critical or abnormal lab result, we will notify you by phone as soon as possible.  Submit refill requests through Pumant or call your pharmacy and they will forward the refill request to us. Please allow 3 business days for your refill to be completed.          Additional Information About Your Visit        BeQuanhart Information     Pumant gives you secure access to your electronic health record. If you see a primary care provider, you can also send messages to your care team and make appointments. If you have questions, please call your primary care clinic.  If you do not have a primary care provider, please call [phone] and they will assist you.        Care EveryWhere ID     This is your Care EveryWhere ID. This could be used by other organizations to access your [facility] medical records  [id number]        Your Vitals Were     Height BMI (Body Mass Index)                6' (1.829 m) 29.02 kg/m2           Blood Pressure from Last 3 Encounters:   09/26/18 120/70   08/28/18 118/80   09/08/17 118/72    Weight from Last 3 Encounters:   09/26/18 214 lb (97.1 kg)   08/28/18 214 lb 6.4 oz (97.3 kg)   09/08/17 211 lb (95.7 kg)              Today, you had the following     No orders found for display       Primary Care  Provider Office Phone # Fax #    Saul Peralta [phone]       [address] [facility] [address]        Equal Access to Services     LAKSHMIJORGE LONDON : Abel viviana colon theresa Rock, waaxda luqadaha, qaybta kaalmada willie, jolene hebertchalo criss. So [facility] [phone].    ATENCIÓN: Si habla español, tiene a mishra disposición servicios gratuitos de asistencia lingüística. Llame al [phone].    We comply with applicable federal civil rights laws and [place] laws. We do not discriminate on the basis of race, color, national origin, age, disability, sex, sexual orientation, or gender identity.            Thank you!     Thank you for choosing [facility] [place]  for your care. Our goal is always to provide you with excellent care. Hearing back from our patients is one way we can continue to improve our services. Please take a few minutes to complete the written survey that you may receive in the mail after your visit with us. Thank you!             Your Updated Medication List - Protect others around you: Learn how to safely use, store and throw away your medicines at www.disposemymeds.org.          This list is accurate as of 9/26/18 10:31 AM.  Always use your most recent med list.                   Brand Name Dispense Instructions for use Diagnosis    ADVIL 200 MG tablet   Generic drug:  ibuprofen      2 TABS PRN        sertraline 100 MG tablet    ZOLOFT    135 tablet    Take 1.5 tablets (150 mg) by mouth daily    Recurrent major depressive disorder, in full remission (H)       tadalafil 5 MG tablet    CIALIS    90 tablet    Take 1 tablet (5 mg) by mouth daily Never use with nitroglycerin, terazosin or doxazosin.    Benign non-nodular prostatic hyperplasia with lower urinary tract symptoms, Drug-induced erectile dysfunction

## 2018-09-26 NOTE — LETTER
9/26/2018         RE: Tariq Perez  [address] [facility] [address]        Dear Colleague,    Thank you for referring your patient, Tariq Perez, to the [facility]. Please see a copy of my visit note below.    PATIENT HISTORY:   Tariq Perez is a 57 year old male who presents to clinic for continued nail fungus.  Would like oral antifungals. Notes he is cleared by his primary. States topicals are not working.     Review of Systems:  Patient denies fever, chills, rash, wound, stiffness, limping, numbness, weakness, heart burn, blood in stool, chest pain with activity, calf pain when walking, shortness of breath with activity, chronic cough, easy bleeding/bruising, swelling of ankles, excessive thirst, fatigue, depression, anxiety.       PAST MEDICAL HISTORY:   Past Medical History:   Diagnosis Date     Depression         PAST SURGICAL HISTORY:   Past Surgical History:   Procedure Laterality Date     COLONOSCOPY  3/3/2012    Procedure:COLONOSCOPY; COLONOSCOPY; Surgeon:AL DIXON; Location: GI     DECOMPRESSION LUMBAR ONE LEVEL       TONSILLECTOMY          MEDICATIONS:   Current Outpatient Prescriptions:      ADVIL 200 MG OR TABS, 2 TABS PRN, Disp: , Rfl:      sertraline (ZOLOFT) 100 MG tablet, Take 1.5 tablets (150 mg) by mouth daily, Disp: 135 tablet, Rfl: 3     tadalafil (CIALIS) 5 MG tablet, Take 1 tablet (5 mg) by mouth daily Never use with nitroglycerin, terazosin or doxazosin., Disp: 90 tablet, Rfl: 3     ALLERGIES:    Allergies   Allergen Reactions     No Known Allergies         SOCIAL HISTORY:   Social History     Social History     Marital status:      Spouse name: Mariel     Number of children: 2     Years of education: 14     Occupational History     computer programer      [employer]     Social History Main Topics     Smoking status: Never Smoker     Smokeless tobacco: Never Used     Alcohol use 0.0 oz/week     0 Standard drinks or equivalent per week       Comment: rare occasion     Drug use: No     Sexual activity: Yes     Partners: Female     Other Topics Concern     Caffeine Concern No     Occupational Exposure No     Hobby Hazards No     Sleep Concern No     Stress Concern No     h/o depression     Weight Concern No     Special Diet No     low salt     Back Care No     Exercise Yes     daily exercise , running, biking     Bike Helmet Yes     Seat Belt Yes     Social History Narrative        FAMILY HISTORY:   Family History   Problem Relation Age of Onset     GASTROINTESTINAL DISEASE Mother      b:1931 gastric reflux     Depression Father      d:age 76 manic depression     Depression Sister      b:1964        EXAM:Vitals:/70  Ht 6' (1.829 m)  Wt 214 lb (97.1 kg)  BMI 29.02 kg/m2    Bilirubin Total 0.5  0.2 - 1.3 mg/dL Final 08/28/2018  8:42 AM 65   Albumin 3.8  3.4 - 5.0 g/dL Final 08/28/2018  8:42 AM 65   Protein Total 7.2  6.8 - 8.8 g/dL Final 08/28/2018  8:42 AM 65   Alkaline Phosphatase 75  40 - 150 U/L Final 08/28/2018  8:42 AM 65   ALT 29  0 - 70 U/L Final 08/28/2018  8:42 AM 65   AST 20  0 - 45 U/L Final 08/28/2018  8:42 AM      General appearance: Patient is alert and fully cooperative with history & exam.  No sign of distress is noted during the visit.     Psychiatric: Affect is pleasant & appropriate.  Patient appears motivated to improve health.     Respiratory: Breathing is regular & unlabored while sitting.     HEENT: Hearing is intact to spoken word.  Speech is clear.  No gross evidence of visual impairment that would impact ambulation.     Dermatologic: both great toenails are thickened, dystrophic, darkened, and present with subungual debri.     Vascular: DP & PT pulses are intact & regular bilaterally.  No significant edema or varicosities noted.  CFT and skin temperature is normal to both lower extremities.     Neurologic: Lower extremity sensation is intact to light touch.  No evidence of weakness or contracture in the lower extremities.   No evidence of neuropathy.     Musculoskeletal: Patient is ambulatory without assistive device or brace.  No gross ankle deformity noted.  No foot or ankle joint effusion is noted.     ASSESSMENT: Onychomycosis of toenail     PLAN:  Reviewed patient's chart in epic. Discussed causes and treatments of nail fungus.  Explained that even if a culture comes back negative, a patient could still have nail fungus.  Discussed treatment options with patient and explained that there isn't one treatment that is 100% effective.  Discussed oral lamisil which is the most effective at about 70% but which can have liver effects.  Explained that if she wanted to try this that she would need serial blood draws to test her liver function.  Discussed over the counter antifungal creams.  Explained that these are about 50% effective and need to be applied twice a day for about 3-4 months.  Also talked about prescription penlac which is a nail laquer.  Again this is also only 50% effective.  Also discussed that if there was damage to the nail and the nail is now dystrophic that non of the above is going to change the nail.  If there was damage, there is note anything that can be done for the nail to correct it.  Discussed that if it becomes painful, we can remove the nail in clinic.    Liver labs appear normal. Will order oral and topical antifungal cream.     Arlene Grubbs DPM, Podiatry/Foot and Ankle Surgery    Weight management plan: Patient was referred to their PCP to discuss a diet and exercise plan.      Again, thank you for allowing me to participate in the care of your patient.        Sincerely,        Arlene Grubbs DPM, Podiatry/Foot and Ankle Surgery

## 2018-09-26 NOTE — PROGRESS NOTES
PATIENT HISTORY:   Tariq Perez is a 57 year old male who presents to clinic for continued nail fungus.  Would like oral antifungals. Notes he is cleared by his primary. States topicals are not working.     Review of Systems:  Patient denies fever, chills, rash, wound, stiffness, limping, numbness, weakness, heart burn, blood in stool, chest pain with activity, calf pain when walking, shortness of breath with activity, chronic cough, easy bleeding/bruising, swelling of ankles, excessive thirst, fatigue, depression, anxiety.       PAST MEDICAL HISTORY:   Past Medical History:   Diagnosis Date     Depression         PAST SURGICAL HISTORY:   Past Surgical History:   Procedure Laterality Date     COLONOSCOPY  3/3/2012    Procedure:COLONOSCOPY; COLONOSCOPY; Surgeon:AL DIXON; Location: GI     DECOMPRESSION LUMBAR ONE LEVEL       TONSILLECTOMY          MEDICATIONS:   Current Outpatient Prescriptions:      ADVIL 200 MG OR TABS, 2 TABS PRN, Disp: , Rfl:      sertraline (ZOLOFT) 100 MG tablet, Take 1.5 tablets (150 mg) by mouth daily, Disp: 135 tablet, Rfl: 3     tadalafil (CIALIS) 5 MG tablet, Take 1 tablet (5 mg) by mouth daily Never use with nitroglycerin, terazosin or doxazosin., Disp: 90 tablet, Rfl: 3     ALLERGIES:    Allergies   Allergen Reactions     No Known Allergies         SOCIAL HISTORY:   Social History     Social History     Marital status:      Spouse name: Mariel     Number of children: 2     Years of education: 14     Occupational History     computer programer      [employer]     Social History Main Topics     Smoking status: Never Smoker     Smokeless tobacco: Never Used     Alcohol use 0.0 oz/week     0 Standard drinks or equivalent per week      Comment: rare occasion     Drug use: No     Sexual activity: Yes     Partners: Female     Other Topics Concern     Caffeine Concern No     Occupational Exposure No     Hobby Hazards No     Sleep Concern No     Stress Concern No     h/o depression      Weight Concern No     Special Diet No     low salt     Back Care No     Exercise Yes     daily exercise , running, biking     Bike Helmet Yes     Seat Belt Yes     Social History Narrative        FAMILY HISTORY:   Family History   Problem Relation Age of Onset     GASTROINTESTINAL DISEASE Mother      b:1930 gastric reflux     Depression Father      d:age 76 manic depression     Depression Sister      b:1964        EXAM:Vitals:/70  Ht 6' (1.829 m)  Wt 214 lb (97.1 kg)  BMI 29.02 kg/m2    Bilirubin Total 0.5  0.2 - 1.3 mg/dL Final 08/28/2018  8:42 AM 65   Albumin 3.8  3.4 - 5.0 g/dL Final 08/28/2018  8:42 AM 65   Protein Total 7.2  6.8 - 8.8 g/dL Final 08/28/2018  8:42 AM 65   Alkaline Phosphatase 75  40 - 150 U/L Final 08/28/2018  8:42 AM 65   ALT 29  0 - 70 U/L Final 08/28/2018  8:42 AM 65   AST 20  0 - 45 U/L Final 08/28/2018  8:42 AM      General appearance: Patient is alert and fully cooperative with history & exam.  No sign of distress is noted during the visit.     Psychiatric: Affect is pleasant & appropriate.  Patient appears motivated to improve health.     Respiratory: Breathing is regular & unlabored while sitting.     HEENT: Hearing is intact to spoken word.  Speech is clear.  No gross evidence of visual impairment that would impact ambulation.     Dermatologic: both great toenails are thickened, dystrophic, darkened, and present with subungual debri.     Vascular: DP & PT pulses are intact & regular bilaterally.  No significant edema or varicosities noted.  CFT and skin temperature is normal to both lower extremities.     Neurologic: Lower extremity sensation is intact to light touch.  No evidence of weakness or contracture in the lower extremities.  No evidence of neuropathy.     Musculoskeletal: Patient is ambulatory without assistive device or brace.  No gross ankle deformity noted.  No foot or ankle joint effusion is noted.     ASSESSMENT: Onychomycosis of toenail     PLAN:  Reviewed  patient's chart in epic. Discussed causes and treatments of nail fungus.  Explained that even if a culture comes back negative, a patient could still have nail fungus.  Discussed treatment options with patient and explained that there isn't one treatment that is 100% effective.  Discussed oral lamisil which is the most effective at about 70% but which can have liver effects.  Explained that if she wanted to try this that she would need serial blood draws to test her liver function.  Discussed over the counter antifungal creams.  Explained that these are about 50% effective and need to be applied twice a day for about 3-4 months.  Also talked about prescription penlac which is a nail laquer.  Again this is also only 50% effective.  Also discussed that if there was damage to the nail and the nail is now dystrophic that non of the above is going to change the nail.  If there was damage, there is note anything that can be done for the nail to correct it.  Discussed that if it becomes painful, we can remove the nail in clinic.    Liver labs appear normal. Will order oral and topical antifungal cream.     Arlene Grubbs DPM, Podiatry/Foot and Ankle Surgery    Weight management plan: Patient was referred to their PCP to discuss a diet and exercise plan.

## 2018-12-09 ENCOUNTER — MYC MEDICAL ADVICE (OUTPATIENT)
Dept: INTERNAL MEDICINE | Facility: CLINIC | Age: 58
End: 2018-12-09

## 2018-12-09 ENCOUNTER — MYC MEDICAL ADVICE (OUTPATIENT)
Dept: PODIATRY | Facility: CLINIC | Age: 58
End: 2018-12-09

## 2018-12-09 DIAGNOSIS — B35.1 ONYCHOMYCOSIS: Primary | ICD-10-CM

## 2018-12-09 DIAGNOSIS — F33.42 RECURRENT MAJOR DEPRESSIVE DISORDER, IN FULL REMISSION (H): ICD-10-CM

## 2018-12-10 RX ORDER — SERTRALINE HYDROCHLORIDE 100 MG/1
100 TABLET, FILM COATED ORAL DAILY
COMMUNITY
Start: 2018-12-10 | End: 2019-10-21

## 2018-12-17 ENCOUNTER — MYC MEDICAL ADVICE (OUTPATIENT)
Dept: PODIATRY | Facility: CLINIC | Age: 58
End: 2018-12-17

## 2018-12-21 ENCOUNTER — TELEPHONE (OUTPATIENT)
Dept: PODIATRY | Facility: CLINIC | Age: 58
End: 2018-12-21

## 2018-12-21 DIAGNOSIS — B35.1 ONYCHOMYCOSIS: ICD-10-CM

## 2018-12-21 DIAGNOSIS — B35.1 ONYCHOMYCOSIS OF TOENAIL: ICD-10-CM

## 2018-12-21 LAB
ALBUMIN SERPL-MCNC: 3.8 G/DL (ref 3.4–5)
ALP SERPL-CCNC: 72 U/L (ref 40–150)
ALT SERPL W P-5'-P-CCNC: 30 U/L (ref 0–70)
AST SERPL W P-5'-P-CCNC: 15 U/L (ref 0–45)
BILIRUB DIRECT SERPL-MCNC: 0.1 MG/DL (ref 0–0.2)
BILIRUB SERPL-MCNC: 0.4 MG/DL (ref 0.2–1.3)
PROT SERPL-MCNC: 7.3 G/DL (ref 6.8–8.8)

## 2018-12-21 PROCEDURE — 80076 HEPATIC FUNCTION PANEL: CPT | Performed by: PODIATRIST

## 2018-12-21 PROCEDURE — 36415 COLL VENOUS BLD VENIPUNCTURE: CPT | Performed by: PODIATRIST

## 2018-12-21 RX ORDER — TERBINAFINE HYDROCHLORIDE 250 MG/1
250 TABLET ORAL DAILY
Qty: 90 TABLET | Refills: 1 | Status: SHIPPED | OUTPATIENT
Start: 2018-12-21 | End: 2018-12-24

## 2018-12-23 ENCOUNTER — MYC MEDICAL ADVICE (OUTPATIENT)
Dept: PODIATRY | Facility: CLINIC | Age: 58
End: 2018-12-23

## 2018-12-23 DIAGNOSIS — B35.1 ONYCHOMYCOSIS OF TOENAIL: Primary | ICD-10-CM

## 2018-12-24 RX ORDER — TERBINAFINE HYDROCHLORIDE 250 MG/1
250 TABLET ORAL DAILY
Qty: 90 TABLET | Refills: 3 | Status: SHIPPED | OUTPATIENT
Start: 2018-12-24 | End: 2019-12-24

## 2018-12-24 NOTE — TELEPHONE ENCOUNTER
Please see MyChart message from patient requesting Terbinafine  250 MG tablets. [facility] pharmacy is selected per patient request.

## 2019-03-19 ENCOUNTER — MYC REFILL (OUTPATIENT)
Dept: INTERNAL MEDICINE | Facility: CLINIC | Age: 59
End: 2019-03-19

## 2019-03-19 DIAGNOSIS — N52.2 DRUG-INDUCED ERECTILE DYSFUNCTION: ICD-10-CM

## 2019-03-19 DIAGNOSIS — N40.1 BENIGN NON-NODULAR PROSTATIC HYPERPLASIA WITH LOWER URINARY TRACT SYMPTOMS: ICD-10-CM

## 2019-03-19 RX ORDER — TADALAFIL 5 MG/1
5 TABLET ORAL DAILY
Qty: 90 TABLET | Refills: 3 | Status: CANCELLED | OUTPATIENT
Start: 2019-03-19

## 2019-03-20 ENCOUNTER — MYC MEDICAL ADVICE (OUTPATIENT)
Dept: INTERNAL MEDICINE | Facility: CLINIC | Age: 59
End: 2019-03-20

## 2019-03-20 NOTE — TELEPHONE ENCOUNTER
tadalafil (CIALIS) 5 MG tablet [Saul Peralta MD]      Patient Comment: I have an active presc. for Cialis 5 MG 90 days @ 1 pill a day with [facility] . RX # [id number].  It has two refills remaing however when I tried to use it to get a refill for the fisrt time in 2019 (3/18/19) I was told that my pre-authorization  2018.   [facility] siad that my Dr's office needs to fill in & fax in some form to allow me to have a 90 pill supply for 90 days.  They said their coverage review # is [id number].     Preferred pharmacy: [facility] - [facility], [place] - [facility]    Please start PA.    Daniela Hwang CMA  [facility]  [place]

## 2019-03-21 ENCOUNTER — TELEPHONE (OUTPATIENT)
Dept: INTERNAL MEDICINE | Facility: CLINIC | Age: 59
End: 2019-03-21

## 2019-03-21 NOTE — TELEPHONE ENCOUNTER
Prior Authorization Retail Medication Request    Medication/Dose: Cialis  ICD code (if different than what is on RX):    Previously Tried and Failed:    Rationale:      Insurance Name:  [employer]  Insurance ID:  [id number]      Pharmacy Information (if different than what is on RX)  Name:  [facility]  Phone:  [phone]    Per patient's [employer] message 3-19-19, patient's PA  in 2018.  Request 90 tabs for a 90 day supply.    Please send patient a [employer] message with outcome of PA for 90 tabs for a 90 day supply.    No need to send a new prescription d/t last refill 19 #0 tabs with 3 refills.

## 2019-03-21 NOTE — TELEPHONE ENCOUNTER
PA Initiation    Medication: cialis  Insurance Company: [facility] - Phone [phone] Fax [phone]  Pharmacy Filling the Rx: [facility] - [place], [place] - [address] [place] [address]  Filling Pharmacy Phone: [phone]  Filling Pharmacy Fax:    Start Date: 3/21/2019

## 2019-03-22 NOTE — TELEPHONE ENCOUNTER
Prior Authorization Approval    Authorization Effective Date: 2/19/2019  Authorization Expiration Date: 3/20/2020  Medication: cialis approved   Approved Dose/Quantity:   Reference #:     Insurance Company: [facility] - Phone [phone] Fax [phone]  Expected CoPay:       CoPay Card Available:      Foundation Assistance Needed:    Which Pharmacy is filling the prescription (Not needed for infusion/clinic administered): [facility] - [facility], [address]  Pharmacy Notified: Yes  Patient Notified: Yes

## 2019-04-17 ENCOUNTER — MYC MEDICAL ADVICE (OUTPATIENT)
Dept: INTERNAL MEDICINE | Facility: CLINIC | Age: 59
End: 2019-04-17

## 2019-04-18 NOTE — TELEPHONE ENCOUNTER
See his mychart message regarding the measles vaccine.     Please advise.     (If needs titers, he is due for Hep C Screen on Health maintenance.)

## 2019-04-19 NOTE — TELEPHONE ENCOUNTER
Testing is not recommended routinely. One dose of MMR is recommended if he has not been immunized in the past.

## 2019-04-26 ENCOUNTER — MYC MEDICAL ADVICE (OUTPATIENT)
Dept: INTERNAL MEDICINE | Facility: CLINIC | Age: 59
End: 2019-04-26

## 2019-05-03 ENCOUNTER — ALLIED HEALTH/NURSE VISIT (OUTPATIENT)
Dept: NURSING | Facility: CLINIC | Age: 59
End: 2019-05-03
Payer: COMMERCIAL

## 2019-05-03 DIAGNOSIS — Z23 NEED FOR VACCINATION: Primary | ICD-10-CM

## 2019-05-03 PROCEDURE — 90471 IMMUNIZATION ADMIN: CPT

## 2019-05-03 PROCEDURE — 90707 MMR VACCINE SC: CPT

## 2019-05-03 NOTE — NURSING NOTE
Screening Questionnaire for Adult Immunization    Are you sick today?   No   Do you have allergies to medications, food, a vaccine component or latex?   No   Have you ever had a serious reaction after receiving a vaccination?   No   Do you have a long-term health problem with heart disease, lung disease, asthma, kidney disease, metabolic disease (e.g. diabetes), anemia, or other blood disorder?   No   Do you have cancer, leukemia, HIV/AIDS, or any other immune system problem?   No   In the past 3 months, have you taken medications that affect  your immune system, such as prednisone, other steroids, or anticancer drugs; drugs for the treatment of rheumatoid arthritis, Crohn s disease, or psoriasis; or have you had radiation treatments?   No   Have you had a seizure, or a brain or other nervous system problem?   No   During the past year, have you received a transfusion of blood or blood     products, or been given immune (gamma) globulin or antiviral drug?   No   For women: Are you pregnant or is there a chance you could become        pregnant during the next month?   No   Have you received any vaccinations in the past 4 weeks?   No     Immunization questionnaire answers were all negative.        Per orders of Dr. Peralta, injection of mmr given by Virginia Hwang. Patient instructed to remain in clinic for 15 minutes afterwards, and to report any adverse reaction to me immediately.       Screening performed by Virginia Hwang on 5/3/2019 at 2:40 PM.

## 2019-10-02 ENCOUNTER — HEALTH MAINTENANCE LETTER (OUTPATIENT)
Age: 59
End: 2019-10-02

## 2019-10-21 ENCOUNTER — OFFICE VISIT (OUTPATIENT)
Dept: INTERNAL MEDICINE | Facility: CLINIC | Age: 59
End: 2019-10-21
Payer: COMMERCIAL

## 2019-10-21 VITALS
WEIGHT: 215.6 LBS | OXYGEN SATURATION: 98 % | SYSTOLIC BLOOD PRESSURE: 139 MMHG | RESPIRATION RATE: 18 BRPM | DIASTOLIC BLOOD PRESSURE: 90 MMHG | BODY MASS INDEX: 29.2 KG/M2 | HEART RATE: 97 BPM | TEMPERATURE: 98.7 F | HEIGHT: 72 IN

## 2019-10-21 DIAGNOSIS — Z23 NEED FOR PROPHYLACTIC VACCINATION AND INOCULATION AGAINST INFLUENZA: ICD-10-CM

## 2019-10-21 DIAGNOSIS — N52.2 DRUG-INDUCED ERECTILE DYSFUNCTION: ICD-10-CM

## 2019-10-21 DIAGNOSIS — N40.1 BENIGN NON-NODULAR PROSTATIC HYPERPLASIA WITH LOWER URINARY TRACT SYMPTOMS: ICD-10-CM

## 2019-10-21 DIAGNOSIS — F33.42 RECURRENT MAJOR DEPRESSIVE DISORDER, IN FULL REMISSION (H): ICD-10-CM

## 2019-10-21 DIAGNOSIS — R03.0 ELEVATED BP WITHOUT DIAGNOSIS OF HYPERTENSION: ICD-10-CM

## 2019-10-21 DIAGNOSIS — Z12.5 SCREENING FOR PROSTATE CANCER: ICD-10-CM

## 2019-10-21 DIAGNOSIS — Z00.00 ENCOUNTER FOR PREVENTATIVE ADULT HEALTH CARE EXAMINATION: Primary | ICD-10-CM

## 2019-10-21 LAB
ALBUMIN SERPL-MCNC: 3.9 G/DL (ref 3.4–5)
ALBUMIN UR-MCNC: NEGATIVE MG/DL
ALP SERPL-CCNC: 81 U/L (ref 40–150)
ALT SERPL W P-5'-P-CCNC: 26 U/L (ref 0–70)
ANION GAP SERPL CALCULATED.3IONS-SCNC: 7 MMOL/L (ref 3–14)
APPEARANCE UR: CLEAR
AST SERPL W P-5'-P-CCNC: 12 U/L (ref 0–45)
BILIRUB SERPL-MCNC: 0.4 MG/DL (ref 0.2–1.3)
BILIRUB UR QL STRIP: NEGATIVE
BUN SERPL-MCNC: 14 MG/DL (ref 7–30)
CALCIUM SERPL-MCNC: 9 MG/DL (ref 8.5–10.1)
CHLORIDE SERPL-SCNC: 107 MMOL/L (ref 94–109)
CHOLEST SERPL-MCNC: 198 MG/DL
CO2 SERPL-SCNC: 27 MMOL/L (ref 20–32)
COLOR UR AUTO: YELLOW
CREAT SERPL-MCNC: 1.08 MG/DL (ref 0.66–1.25)
ERYTHROCYTE [DISTWIDTH] IN BLOOD BY AUTOMATED COUNT: 13 % (ref 10–15)
GFR SERPL CREATININE-BSD FRML MDRD: 75 ML/MIN/{1.73_M2}
GLUCOSE SERPL-MCNC: 94 MG/DL (ref 70–99)
GLUCOSE UR STRIP-MCNC: NEGATIVE MG/DL
HCT VFR BLD AUTO: 43 % (ref 40–53)
HDLC SERPL-MCNC: 70 MG/DL
HGB BLD-MCNC: 14.4 G/DL (ref 13.3–17.7)
HGB UR QL STRIP: NEGATIVE
KETONES UR STRIP-MCNC: NEGATIVE MG/DL
LDLC SERPL CALC-MCNC: 117 MG/DL
LEUKOCYTE ESTERASE UR QL STRIP: NEGATIVE
MCH RBC QN AUTO: 31.3 PG (ref 26.5–33)
MCHC RBC AUTO-ENTMCNC: 33.5 G/DL (ref 31.5–36.5)
MCV RBC AUTO: 94 FL (ref 78–100)
NITRATE UR QL: NEGATIVE
NONHDLC SERPL-MCNC: 128 MG/DL
PH UR STRIP: 6 PH (ref 5–7)
PLATELET # BLD AUTO: 212 10E9/L (ref 150–450)
POTASSIUM SERPL-SCNC: 3.7 MMOL/L (ref 3.4–5.3)
PROT SERPL-MCNC: 7 G/DL (ref 6.8–8.8)
RBC # BLD AUTO: 4.6 10E12/L (ref 4.4–5.9)
SODIUM SERPL-SCNC: 141 MMOL/L (ref 133–144)
SOURCE: NORMAL
SP GR UR STRIP: 1.01 (ref 1–1.03)
TRIGL SERPL-MCNC: 56 MG/DL
TSH SERPL DL<=0.005 MIU/L-ACNC: 1.4 MU/L (ref 0.4–4)
UROBILINOGEN UR STRIP-ACNC: 0.2 EU/DL (ref 0.2–1)
WBC # BLD AUTO: 6 10E9/L (ref 4–11)

## 2019-10-21 PROCEDURE — 80053 COMPREHEN METABOLIC PANEL: CPT | Performed by: INTERNAL MEDICINE

## 2019-10-21 PROCEDURE — 90471 IMMUNIZATION ADMIN: CPT | Performed by: INTERNAL MEDICINE

## 2019-10-21 PROCEDURE — G0103 PSA SCREENING: HCPCS | Performed by: INTERNAL MEDICINE

## 2019-10-21 PROCEDURE — 84443 ASSAY THYROID STIM HORMONE: CPT | Performed by: INTERNAL MEDICINE

## 2019-10-21 PROCEDURE — 36415 COLL VENOUS BLD VENIPUNCTURE: CPT | Performed by: INTERNAL MEDICINE

## 2019-10-21 PROCEDURE — 99213 OFFICE O/P EST LOW 20 MIN: CPT | Mod: 25 | Performed by: INTERNAL MEDICINE

## 2019-10-21 PROCEDURE — 99396 PREV VISIT EST AGE 40-64: CPT | Mod: 25 | Performed by: INTERNAL MEDICINE

## 2019-10-21 PROCEDURE — 90682 RIV4 VACC RECOMBINANT DNA IM: CPT | Performed by: INTERNAL MEDICINE

## 2019-10-21 PROCEDURE — 81003 URINALYSIS AUTO W/O SCOPE: CPT | Performed by: INTERNAL MEDICINE

## 2019-10-21 PROCEDURE — 85027 COMPLETE CBC AUTOMATED: CPT | Performed by: INTERNAL MEDICINE

## 2019-10-21 PROCEDURE — 80061 LIPID PANEL: CPT | Performed by: INTERNAL MEDICINE

## 2019-10-21 RX ORDER — FINASTERIDE 5 MG/1
5 TABLET, FILM COATED ORAL DAILY
Qty: 30 TABLET | Refills: 3 | Status: SHIPPED | OUTPATIENT
Start: 2019-10-21 | End: 2019-10-21

## 2019-10-21 RX ORDER — TADALAFIL 5 MG/1
5 TABLET ORAL DAILY
Qty: 90 TABLET | Refills: 3 | Status: SHIPPED | OUTPATIENT
Start: 2019-10-21 | End: 2020-10-12

## 2019-10-21 RX ORDER — FINASTERIDE 5 MG/1
5 TABLET, FILM COATED ORAL DAILY
Qty: 30 TABLET | Refills: 3 | Status: SHIPPED | OUTPATIENT
Start: 2019-10-21 | End: 2020-10-12

## 2019-10-21 RX ORDER — SERTRALINE HYDROCHLORIDE 100 MG/1
100 TABLET, FILM COATED ORAL DAILY
Qty: 90 TABLET | Refills: 3 | Status: SHIPPED | OUTPATIENT
Start: 2019-10-21 | End: 2020-10-12

## 2019-10-21 ASSESSMENT — ENCOUNTER SYMPTOMS
HEMATOCHEZIA: 0
COUGH: 1
WEAKNESS: 0
DIZZINESS: 0
DIARRHEA: 0
CHILLS: 0
FREQUENCY: 1
CONSTIPATION: 0
HEADACHES: 0
HEMATURIA: 0
PARESTHESIAS: 0
PALPITATIONS: 0
MYALGIAS: 0
HEARTBURN: 1
ABDOMINAL PAIN: 0
NAUSEA: 0
SORE THROAT: 0
JOINT SWELLING: 0
SHORTNESS OF BREATH: 0
DYSURIA: 0
ARTHRALGIAS: 0

## 2019-10-21 ASSESSMENT — MIFFLIN-ST. JEOR: SCORE: 1840.96

## 2019-10-21 NOTE — PROGRESS NOTES
SUBJECTIVE:   CC: Tariq Perez is an 58 year old male who presents for preventative health visit.     Healthy Habits:     Getting at least 3 servings of Calcium per day:  Yes    Bi-annual eye exam:  Yes    Dental care twice a year:  Yes    Sleep apnea or symptoms of sleep apnea:  Excessive snoring    Diet:  Regular (no restrictions) and Other    Frequency of exercise:  4-5 days/week    Duration of exercise:  30-45 minutes    Taking medications regularly:  Yes    Medication side effects:  Other    PHQ-2 Total Score: 0    Additional concerns today:  Yes    Ability to successfully perform activities of daily living: Yes, no assistance needed  Home safety:  none identified   Hearing impairment: No        PROBLEMS TO ADD ON...  Has h/o depression. On medical treatment, controlled, no side effects. No depressive symptoms or suicidal ideation.  Has H/O BPH. On treatment with daily Cialis . Has had symptoms of frequency, decreased urinary stream and nocturia, no hematuria  or dysuria. No side effects from medications.  Has h/o ED. Cialis helps with symptoms.     Today's PHQ-2 Score:   PHQ-2 ( 1999 Pfizer) 10/21/2019   Q1: Little interest or pleasure in doing things 0   Q2: Feeling down, depressed or hopeless 0   PHQ-2 Score 0   Q1: Little interest or pleasure in doing things Not at all   Q2: Feeling down, depressed or hopeless Not at all   PHQ-2 Score 0       Abuse: Current or Past(Physical, Sexual or Emotional)- No  Do you feel safe in your environment? Yes    Social History     Tobacco Use     Smoking status: Never Smoker     Smokeless tobacco: Never Used   Substance Use Topics     Alcohol use: Yes     Alcohol/week: 0.0 standard drinks     Comment: rare occasion     If you drink alcohol do you typically have >3 drinks per day or >7 drinks per week? Yes      Alcohol Use 10/21/2019   Prescreen: >3 drinks/day or >7 drinks/week? No   Prescreen: >3 drinks/day or >7 drinks/week? -   No flowsheet data found.    Last PSA:    PSA   Date Value Ref Range Status   08/28/2018 0.83 0 - 4 ug/L Final     Comment:     Assay Method:  Chemiluminescence using Siemens Vista analyzer       Reviewed orders with patient. Reviewed health maintenance and updated orders accordingly - Yes  Lab work is in process  Labs reviewed in EPIC    Reviewed and updated as needed this visit by clinical staff  Allergies  Meds         Reviewed and updated as needed this visit by Provider            Review of Systems   Constitutional: Negative for chills.   HENT: Negative for congestion, hearing loss and sore throat.    Eyes: Negative for visual disturbance.   Respiratory: Positive for cough. Negative for shortness of breath.    Cardiovascular: Negative for chest pain, palpitations and peripheral edema.   Gastrointestinal: Positive for heartburn. Negative for abdominal pain, constipation, diarrhea, hematochezia and nausea.   Genitourinary: Positive for frequency and impotence. Negative for discharge, dysuria, genital sores, hematuria and urgency.   Musculoskeletal: Negative for arthralgias, joint swelling and myalgias.   Skin: Negative for rash.   Neurological: Negative for dizziness, weakness, headaches and paresthesias.   Psychiatric/Behavioral: Negative for mood changes.         OBJECTIVE:   There were no vitals taken for this visit.    Physical Exam  GENERAL: healthy, alert and no distress  EYES: Eyes grossly normal to inspection, PERRL and conjunctivae and sclerae normal  HENT: ear canals and TM's normal, nose and mouth without ulcers or lesions  NECK: no adenopathy, no asymmetry, masses, or scars and thyroid normal to palpation  RESP: lungs clear to auscultation - no rales, rhonchi or wheezes  CV: regular rate and rhythm, normal S1 S2, no S3 or S4, no murmur, click or rub, no peripheral edema and peripheral pulses strong  ABDOMEN: soft, nontender, no hepatosplenomegaly, no masses and bowel sounds normal  RECTAL: normal sphincter tone, no rectal masses, prostate  1+ normal size, smooth, nontender without nodules or masses, small internal hemorrhoid left side   MS: no gross musculoskeletal defects noted, no edema  SKIN: no suspicious lesions or rashes  NEURO: Normal strength and tone, mentation intact and speech normal  PSYCH: mentation appears normal, affect normal/bright    Diagnostic Test Results:  Labs reviewed in Epic    ASSESSMENT/PLAN:       ICD-10-CM    1. Encounter for preventative adult health care examination Z00.00 CBC with platelets     Comprehensive metabolic panel     Lipid panel reflex to direct LDL Fasting     TSH with free T4 reflex     Prostate spec antigen screen     *UA reflex to Microscopic   2. Recurrent major depressive disorder, in full remission (H) F33.42 sertraline (ZOLOFT) 100 MG tablet   3. Benign non-nodular prostatic hyperplasia with lower urinary tract symptoms N40.1 tadalafil (CIALIS) 5 MG tablet     finasteride (PROSCAR) 5 MG tablet     DISCONTINUED: finasteride (PROSCAR) 5 MG tablet   4. Drug-induced erectile dysfunction N52.2 tadalafil (CIALIS) 5 MG tablet   5. Screening for prostate cancer Z12.5 Prostate spec antigen screen     Monitor BP,   Keep low salt diet  Start on Proscar, advised for side effects       COUNSELING:   Reviewed preventive health counseling, as reflected in patient instructions       Regular exercise       Healthy diet/nutrition       Vision screening       Hearing screening       Colon cancer screening       Prostate cancer screening    Estimated body mass index is 29.02 kg/m  as calculated from the following:    Height as of 9/26/18: 1.829 m (6').    Weight as of 9/26/18: 97.1 kg (214 lb).          reports that he has never smoked. He has never used smokeless tobacco.      Counseling Resources:  ATP IV Guidelines  Pooled Cohorts Equation Calculator  FRAX Risk Assessment  ICSI Preventive Guidelines  Dietary Guidelines for Americans, 2010  USDA's MyPlate  ASA Prophylaxis  Lung CA Screening    Saul Peralta,  MD  [facility]

## 2019-10-21 NOTE — NURSING NOTE
BP (!) 139/90 (BP Location: Left arm, Patient Position: Sitting, Cuff Size: Adult Regular)   Pulse 97   Temp 98.7  F (37.1  C) (Oral)   Resp 18   Ht 1.829 m (6')   Wt 97.8 kg (215 lb 9.6 oz)   SpO2 98%   BMI 29.24 kg/m

## 2019-10-21 NOTE — LETTER
[facility]  [address], [address]  [place] [address]  [phone]        October 23, 2019    Tariq Perez  [address] [facility] [address]            Dear Ruslan Naranjovanesa MILES Ana:      The results of your recent labs were NORMAL.      If you have any further questions or problems, please contact our office.      Sincerely,        Saul Peralta M.D.

## 2019-10-22 LAB — PSA SERPL-ACNC: 0.84 UG/L (ref 0–4)

## 2019-10-29 ENCOUNTER — E-VISIT (OUTPATIENT)
Dept: INTERNAL MEDICINE | Facility: CLINIC | Age: 59
End: 2019-10-29
Payer: COMMERCIAL

## 2019-10-29 DIAGNOSIS — I10 ESSENTIAL HYPERTENSION: Primary | ICD-10-CM

## 2019-10-29 PROCEDURE — 99444 ZZC PHYSICIAN ONLINE EVALUATION & MANAGEMENT SERVICE: CPT | Performed by: INTERNAL MEDICINE

## 2019-10-31 RX ORDER — LOSARTAN POTASSIUM 50 MG/1
50 TABLET ORAL DAILY
Qty: 30 TABLET | Refills: 3 | Status: SHIPPED | OUTPATIENT
Start: 2019-10-31 | End: 2020-02-04

## 2019-10-31 NOTE — TELEPHONE ENCOUNTER
BP is consistently elevated .   Recommend to start on treatment..   Will call in Losartan 50 mg daily.   Let's follow up in the clinic in one month for recheck.

## 2019-11-29 ENCOUNTER — E-VISIT (OUTPATIENT)
Dept: INTERNAL MEDICINE | Facility: CLINIC | Age: 59
End: 2019-11-29
Payer: COMMERCIAL

## 2019-11-29 ENCOUNTER — MYC MEDICAL ADVICE (OUTPATIENT)
Dept: INTERNAL MEDICINE | Facility: CLINIC | Age: 59
End: 2019-11-29

## 2019-11-29 DIAGNOSIS — I10 ESSENTIAL HYPERTENSION: Primary | ICD-10-CM

## 2019-11-29 PROCEDURE — 99444 ZZC PHYSICIAN ONLINE EVALUATION & MANAGEMENT SERVICE: CPT | Performed by: INTERNAL MEDICINE

## 2019-11-29 NOTE — TELEPHONE ENCOUNTER
Patient sent MyChart message regarding confirmation that evist was sent, provider. Writer verified and confirmed evist was sent to provider, sent patient MyChart message reply.

## 2019-12-02 DIAGNOSIS — I10 ESSENTIAL HYPERTENSION: ICD-10-CM

## 2019-12-02 PROCEDURE — 80048 BASIC METABOLIC PNL TOTAL CA: CPT | Performed by: INTERNAL MEDICINE

## 2019-12-02 PROCEDURE — 36415 COLL VENOUS BLD VENIPUNCTURE: CPT | Performed by: INTERNAL MEDICINE

## 2019-12-03 LAB
ANION GAP SERPL CALCULATED.3IONS-SCNC: 4 MMOL/L (ref 3–14)
BUN SERPL-MCNC: 20 MG/DL (ref 7–30)
CALCIUM SERPL-MCNC: 9.2 MG/DL (ref 8.5–10.1)
CHLORIDE SERPL-SCNC: 105 MMOL/L (ref 94–109)
CO2 SERPL-SCNC: 28 MMOL/L (ref 20–32)
CREAT SERPL-MCNC: 1.11 MG/DL (ref 0.66–1.25)
GFR SERPL CREATININE-BSD FRML MDRD: 73 ML/MIN/{1.73_M2}
GLUCOSE SERPL-MCNC: 79 MG/DL (ref 70–99)
POTASSIUM SERPL-SCNC: 4 MMOL/L (ref 3.4–5.3)
SODIUM SERPL-SCNC: 137 MMOL/L (ref 133–144)

## 2020-02-03 DIAGNOSIS — I10 ESSENTIAL HYPERTENSION: ICD-10-CM

## 2020-02-03 NOTE — TELEPHONE ENCOUNTER
"PHARMACY IS REQUESTING 90 DAY REFILLS    Requested Prescriptions   Pending Prescriptions Disp Refills     losartan (COZAAR) 50 MG tablet  Last Written Prescription Date:  10/31/19  Last Fill Quantity: 30,  # refills: 3   Last office visit: 10/21/2019 with prescribing provider:  10/21/19   Future Office Visit:     30 tablet 3     Sig: Take 1 tablet (50 mg) by mouth daily       Angiotensin-II Receptors Failed - 2/3/2020 10:13 AM        Failed - Last blood pressure under 140/90 in past 12 months     BP Readings from Last 3 Encounters:   10/21/19 (!) 139/90   09/26/18 120/70   08/28/18 118/80                 Passed - Recent (12 mo) or future (30 days) visit within the authorizing provider's specialty     Patient has had an office visit with the authorizing provider or a provider within the authorizing providers department within the previous 12 mos or has a future within next 30 days. See \"Patient Info\" tab in inbasket, or \"Choose Columns\" in Meds & Orders section of the refill encounter.              Passed - Medication is active on med list        Passed - Patient is age 18 or older        Passed - Normal serum creatinine on file in past 12 months     Recent Labs   Lab Test 12/02/19  0932   CR 1.11             Passed - Normal serum potassium on file in past 12 months     Recent Labs   Lab Test 12/02/19  0932   POTASSIUM 4.0                      "

## 2020-02-04 RX ORDER — LOSARTAN POTASSIUM 50 MG/1
50 TABLET ORAL DAILY
Qty: 90 TABLET | Refills: 0 | Status: SHIPPED | OUTPATIENT
Start: 2020-02-04 | End: 2020-04-16

## 2020-02-04 NOTE — TELEPHONE ENCOUNTER
Pt had Evisit on 11/29/19   Mail order needs 90 days faxed in.       BP Readings from Last 3 Encounters:   10/21/19 (!) 139/90   09/26/18 120/70   08/28/18 118/80     Creatinine   Date Value Ref Range Status   12/02/2019 1.11 0.66 - 1.25 mg/dL Final

## 2020-02-17 NOTE — TELEPHONE ENCOUNTER
Patient advised approved [id number].   Please let patient know I also ordered M consult for when she has her ultrasound due to her diabetes.

## 2020-03-20 ENCOUNTER — TELEPHONE (OUTPATIENT)
Dept: INTERNAL MEDICINE | Facility: CLINIC | Age: 60
End: 2020-03-20

## 2020-03-20 NOTE — TELEPHONE ENCOUNTER
Prior Authorization Retail Medication Request    Medication/Dose: TADALAFIL 5 MG  ICD code (if different than what is on RX):    Previously Tried and Failed:    Rationale:      Insurance Name:  [employer]  Insurance ID:  [id number]       Pharmacy Information (if different than what is on RX)  Name:  [facility]  Phone:  [phone]

## 2020-03-20 NOTE — TELEPHONE ENCOUNTER
Central Prior Authorization Team   Phone: [phone]      PA Initiation    Medication: TADALAFIL  Insurance Company: [facility] - Phone [phone] Fax [phone]  Pharmacy Filling the Rx: [facility] - [place], [place] - [address] [place] [address]  Filling Pharmacy Phone: [phone]  Filling Pharmacy Fax:    Start Date: 3/20/2020

## 2020-03-20 NOTE — TELEPHONE ENCOUNTER
Prior Authorization Approval    Authorization Effective Date: 2/19/2020  Authorization Expiration Date: 3/20/2021  Medication: TADALAFIL  Approved Dose/Quantity:    Reference #: [id number]   Insurance Company: [facility] - Phone [phone] Fax [phone]  Expected CoPay:       CoPay Card Available:      Foundation Assistance Needed:    Which Pharmacy is filling the prescription (Not needed for infusion/clinic administered): [facility] - [address]  Pharmacy Notified: Yes  Patient Notified: Yes **Instructed pharmacy to notify patient when script is ready to /ship.**

## 2020-04-15 DIAGNOSIS — I10 ESSENTIAL HYPERTENSION: ICD-10-CM

## 2020-04-16 RX ORDER — LOSARTAN POTASSIUM 50 MG/1
TABLET ORAL
Qty: 90 TABLET | Refills: 3 | Status: SHIPPED | OUTPATIENT
Start: 2020-04-16 | End: 2020-10-12

## 2020-04-16 NOTE — TELEPHONE ENCOUNTER
Routing refill request to provider for review/approval because:  Last BP out of range.    BP Readings from Last 3 Encounters:   10/21/19 (!) 139/90   09/26/18 120/70   08/28/18 118/80

## 2020-07-27 ENCOUNTER — MYC MEDICAL ADVICE (OUTPATIENT)
Dept: INTERNAL MEDICINE | Facility: CLINIC | Age: 60
End: 2020-07-27

## 2020-07-27 DIAGNOSIS — Z12.5 SCREENING FOR PROSTATE CANCER: ICD-10-CM

## 2020-07-27 DIAGNOSIS — I10 ESSENTIAL HYPERTENSION: Primary | ICD-10-CM

## 2020-08-30 ENCOUNTER — MYC MEDICAL ADVICE (OUTPATIENT)
Dept: INTERNAL MEDICINE | Facility: CLINIC | Age: 60
End: 2020-08-30

## 2020-10-07 DIAGNOSIS — I10 ESSENTIAL HYPERTENSION: ICD-10-CM

## 2020-10-07 DIAGNOSIS — Z12.5 SCREENING FOR PROSTATE CANCER: ICD-10-CM

## 2020-10-07 LAB
ALBUMIN SERPL-MCNC: 3.5 G/DL (ref 3.4–5)
ALBUMIN UR-MCNC: NEGATIVE MG/DL
ALP SERPL-CCNC: 64 U/L (ref 40–150)
ALT SERPL W P-5'-P-CCNC: 31 U/L (ref 0–70)
ANION GAP SERPL CALCULATED.3IONS-SCNC: 6 MMOL/L (ref 3–14)
APPEARANCE UR: CLEAR
AST SERPL W P-5'-P-CCNC: 20 U/L (ref 0–45)
BILIRUB SERPL-MCNC: 0.5 MG/DL (ref 0.2–1.3)
BILIRUB UR QL STRIP: NEGATIVE
BUN SERPL-MCNC: 18 MG/DL (ref 7–30)
CALCIUM SERPL-MCNC: 9 MG/DL (ref 8.5–10.1)
CHLORIDE SERPL-SCNC: 104 MMOL/L (ref 94–109)
CHOLEST SERPL-MCNC: 186 MG/DL
CO2 SERPL-SCNC: 26 MMOL/L (ref 20–32)
COLOR UR AUTO: YELLOW
CREAT SERPL-MCNC: 1.08 MG/DL (ref 0.66–1.25)
ERYTHROCYTE [DISTWIDTH] IN BLOOD BY AUTOMATED COUNT: 12.3 % (ref 10–15)
GFR SERPL CREATININE-BSD FRML MDRD: 75 ML/MIN/{1.73_M2}
GLUCOSE SERPL-MCNC: 83 MG/DL (ref 70–99)
GLUCOSE UR STRIP-MCNC: NEGATIVE MG/DL
HCT VFR BLD AUTO: 42.1 % (ref 40–53)
HDLC SERPL-MCNC: 72 MG/DL
HGB BLD-MCNC: 14.1 G/DL (ref 13.3–17.7)
HGB UR QL STRIP: NEGATIVE
KETONES UR STRIP-MCNC: NEGATIVE MG/DL
LDLC SERPL CALC-MCNC: 105 MG/DL
LEUKOCYTE ESTERASE UR QL STRIP: NEGATIVE
MCH RBC QN AUTO: 31.8 PG (ref 26.5–33)
MCHC RBC AUTO-ENTMCNC: 33.5 G/DL (ref 31.5–36.5)
MCV RBC AUTO: 95 FL (ref 78–100)
NITRATE UR QL: NEGATIVE
NONHDLC SERPL-MCNC: 114 MG/DL
PH UR STRIP: 6.5 PH (ref 5–7)
PLATELET # BLD AUTO: 194 10E9/L (ref 150–450)
POTASSIUM SERPL-SCNC: 4.3 MMOL/L (ref 3.4–5.3)
PROT SERPL-MCNC: 7.1 G/DL (ref 6.8–8.8)
PSA SERPL-ACNC: 1.12 UG/L (ref 0–4)
RBC # BLD AUTO: 4.43 10E12/L (ref 4.4–5.9)
SODIUM SERPL-SCNC: 136 MMOL/L (ref 133–144)
SOURCE: NORMAL
SP GR UR STRIP: 1.01 (ref 1–1.03)
TRIGL SERPL-MCNC: 45 MG/DL
TSH SERPL DL<=0.005 MIU/L-ACNC: 1.2 MU/L (ref 0.4–4)
UROBILINOGEN UR STRIP-ACNC: 0.2 EU/DL (ref 0.2–1)
WBC # BLD AUTO: 4.3 10E9/L (ref 4–11)

## 2020-10-07 PROCEDURE — 81003 URINALYSIS AUTO W/O SCOPE: CPT | Performed by: INTERNAL MEDICINE

## 2020-10-07 PROCEDURE — G0103 PSA SCREENING: HCPCS | Performed by: INTERNAL MEDICINE

## 2020-10-07 PROCEDURE — 85027 COMPLETE CBC AUTOMATED: CPT | Performed by: INTERNAL MEDICINE

## 2020-10-07 PROCEDURE — 80053 COMPREHEN METABOLIC PANEL: CPT | Performed by: INTERNAL MEDICINE

## 2020-10-07 PROCEDURE — 80061 LIPID PANEL: CPT | Performed by: INTERNAL MEDICINE

## 2020-10-07 PROCEDURE — 84443 ASSAY THYROID STIM HORMONE: CPT | Performed by: INTERNAL MEDICINE

## 2020-10-08 ASSESSMENT — ENCOUNTER SYMPTOMS
HEARTBURN: 0
DIZZINESS: 0
ABDOMINAL PAIN: 0
CHILLS: 0
NERVOUS/ANXIOUS: 0
MYALGIAS: 0
NAUSEA: 0
SORE THROAT: 0
SHORTNESS OF BREATH: 0
FREQUENCY: 1
HEMATOCHEZIA: 0
HEMATURIA: 0
HEADACHES: 0
PARESTHESIAS: 0
PALPITATIONS: 0
FEVER: 0
DYSURIA: 0
CONSTIPATION: 0
ARTHRALGIAS: 0
COUGH: 0
WEAKNESS: 0
JOINT SWELLING: 0
DIARRHEA: 0
EYE PAIN: 0

## 2020-10-12 ENCOUNTER — OFFICE VISIT (OUTPATIENT)
Dept: INTERNAL MEDICINE | Facility: CLINIC | Age: 60
End: 2020-10-12
Payer: COMMERCIAL

## 2020-10-12 VITALS
BODY MASS INDEX: 28.58 KG/M2 | TEMPERATURE: 98.4 F | HEIGHT: 72 IN | OXYGEN SATURATION: 100 % | WEIGHT: 211 LBS | DIASTOLIC BLOOD PRESSURE: 89 MMHG | SYSTOLIC BLOOD PRESSURE: 130 MMHG | HEART RATE: 106 BPM

## 2020-10-12 DIAGNOSIS — N40.1 BENIGN NON-NODULAR PROSTATIC HYPERPLASIA WITH LOWER URINARY TRACT SYMPTOMS: ICD-10-CM

## 2020-10-12 DIAGNOSIS — N52.2 DRUG-INDUCED ERECTILE DYSFUNCTION: ICD-10-CM

## 2020-10-12 DIAGNOSIS — F33.42 RECURRENT MAJOR DEPRESSIVE DISORDER, IN FULL REMISSION (H): ICD-10-CM

## 2020-10-12 DIAGNOSIS — Z23 NEED FOR SHINGLES VACCINE: ICD-10-CM

## 2020-10-12 DIAGNOSIS — Z23 NEED FOR PROPHYLACTIC VACCINATION AND INOCULATION AGAINST INFLUENZA: ICD-10-CM

## 2020-10-12 DIAGNOSIS — Z00.00 ENCOUNTER FOR PREVENTATIVE ADULT HEALTH CARE EXAMINATION: Primary | ICD-10-CM

## 2020-10-12 PROCEDURE — 90682 RIV4 VACC RECOMBINANT DNA IM: CPT | Performed by: INTERNAL MEDICINE

## 2020-10-12 PROCEDURE — 90750 HZV VACC RECOMBINANT IM: CPT | Performed by: INTERNAL MEDICINE

## 2020-10-12 PROCEDURE — 90471 IMMUNIZATION ADMIN: CPT | Performed by: INTERNAL MEDICINE

## 2020-10-12 PROCEDURE — 90472 IMMUNIZATION ADMIN EACH ADD: CPT | Performed by: INTERNAL MEDICINE

## 2020-10-12 PROCEDURE — 99396 PREV VISIT EST AGE 40-64: CPT | Mod: 25 | Performed by: INTERNAL MEDICINE

## 2020-10-12 RX ORDER — SERTRALINE HYDROCHLORIDE 100 MG/1
100 TABLET, FILM COATED ORAL DAILY
Qty: 90 TABLET | Refills: 3 | Status: SHIPPED | OUTPATIENT
Start: 2020-10-12 | End: 2022-11-23

## 2020-10-12 RX ORDER — TADALAFIL 5 MG/1
5 TABLET ORAL DAILY
Qty: 90 TABLET | Refills: 3 | Status: SHIPPED | OUTPATIENT
Start: 2020-10-12 | End: 2021-09-07

## 2020-10-12 ASSESSMENT — PATIENT HEALTH QUESTIONNAIRE - PHQ9: SUM OF ALL RESPONSES TO PHQ QUESTIONS 1-9: 0

## 2020-10-12 ASSESSMENT — MIFFLIN-ST. JEOR: SCORE: 1815.09

## 2020-10-12 NOTE — PROGRESS NOTES
SUBJECTIVE:   CC: Tariq Perez is an 59 year old male who presents for preventative health visit.       Patient has been advised of split billing requirements and indicates understanding: Yes  Healthy Habits:     Getting at least 3 servings of Calcium per day:  Yes    Bi-annual eye exam:  Yes    Dental care twice a year:  Yes    Sleep apnea or symptoms of sleep apnea:  None    Diet:  Regular (no restrictions)    Frequency of exercise:  4-5 days/week    Duration of exercise:  30-45 minutes    Taking medications regularly:  Yes    Medication side effects:  None    PHQ-2 Total Score: 0    Additional concerns today:  No          PROBLEMS TO ADD ON...  No acute complaints, no medication change or new medical conditions.  Has h/o anxiety, depression. On medical treatment, controlled, no side effects. No depressive symptoms or suicidal ideation.  Has h/o HTN, not on treatment, manages with diet, exercise. Controlled.       Today's PHQ-2 Score:   PHQ-2 ( 1999 [employer]) 10/8/2020   Q1: Little interest or pleasure in doing things 0   Q2: Feeling down, depressed or hopeless 0   PHQ-2 Score 0   Q1: Little interest or pleasure in doing things Not at all   Q2: Feeling down, depressed or hopeless Not at all   PHQ-2 Score 0       Abuse: Current or Past(Physical, Sexual or Emotional)- No  Do you feel safe in your environment? Yes    Have you ever done Advance Care Planning? (For example, a Health Directive, POLST, or a discussion with a medical provider or your loved ones about your wishes): Yes, patient states has an Advance Care Planning document and will bring a copy to the clinic.    Social History     Tobacco Use     Smoking status: Never Smoker     Smokeless tobacco: Never Used   Substance Use Topics     Alcohol use: Yes     Alcohol/week: 0.0 standard drinks     Comment: rare occasion     If you drink alcohol do you typically have >3 drinks per day or >7 drinks per week? No    Alcohol Use 10/8/2020   Prescreen: >3 drinks/day  or >7 drinks/week? No   Prescreen: >3 drinks/day or >7 drinks/week? -       Last PSA:   PSA   Date Value Ref Range Status   10/07/2020 1.12 0 - 4 ug/L Final     Comment:     Assay Method:  Chemiluminescence using Siemens Vista analyzer       Reviewed orders with patient. Reviewed health maintenance and updated orders accordingly - Yes  Labs reviewed in EPIC    Reviewed and updated as needed this visit by clinical staff  Tobacco  Allergies  Meds   Med Hx  Surg Hx  Fam Hx  Soc Hx        Reviewed and updated as needed this visit by Provider                    Review of Systems  CONSTITUTIONAL: NEGATIVE for fever, chills, change in weight  INTEGUMENTARY/SKIN: NEGATIVE for worrisome rashes, moles or lesions  EYES: NEGATIVE for vision changes or irritation  ENT: NEGATIVE for ear, mouth and throat problems  RESP: NEGATIVE for significant cough or SOB  CV: NEGATIVE for chest pain, palpitations or peripheral edema  GI: NEGATIVE for nausea, abdominal pain, heartburn, or change in bowel habits   male: negative for dysuria, hematuria, decreased urinary stream, erectile dysfunction, urethral discharge  MUSCULOSKELETAL: NEGATIVE for significant arthralgias or myalgia  NEURO: NEGATIVE for weakness, dizziness or paresthesias  PSYCHIATRIC: NEGATIVE for changes in mood or affect    OBJECTIVE:   /89 (BP Location: Left arm, Patient Position: Sitting, Cuff Size: Adult Large)   Pulse 106   Temp 98.4  F (36.9  C) (Oral)   Ht 1.829 m (6')   Wt 95.7 kg (211 lb)   SpO2 100%   BMI 28.62 kg/m      Physical Exam  GENERAL: healthy, alert and no distress  EYES: Eyes grossly normal to inspection, PERRL and conjunctivae and sclerae normal  HENT: ear canals and TM's normal, nose and mouth without ulcers or lesions  NECK: no adenopathy, no asymmetry, masses, or scars and thyroid normal to palpation  RESP: lungs clear to auscultation - no rales, rhonchi or wheezes  CV: regular rate and rhythm, normal S1 S2, no S3 or S4, no murmur,  click or rub, no peripheral edema and peripheral pulses strong  ABDOMEN: soft, nontender, no hepatosplenomegaly, no masses and bowel sounds normal  MS: no gross musculoskeletal defects noted, no edema  SKIN: no suspicious lesions or rashes  NEURO: Normal strength and tone, mentation intact and speech normal  PSYCH: mentation appears normal, affect normal/bright    Diagnostic Test Results:  Labs reviewed in Epic    ASSESSMENT/PLAN:       ICD-10-CM    1. Encounter for preventative adult health care examination  Z00.00    2. Recurrent major depressive disorder, in full remission (H)  F33.42 sertraline (ZOLOFT) 100 MG tablet   3. Benign non-nodular prostatic hyperplasia with lower urinary tract symptoms  N40.1 tadalafil (CIALIS) 5 MG tablet   4. Drug-induced erectile dysfunction  N52.2 tadalafil (CIALIS) 5 MG tablet       Patient has been advised of split billing requirements and indicates understanding: Yes  COUNSELING:   Reviewed preventive health counseling, as reflected in patient instructions       Regular exercise       Healthy diet/nutrition       Vision screening       Hearing screening       Colon cancer screening       Prostate cancer screening    Estimated body mass index is 28.62 kg/m  as calculated from the following:    Height as of this encounter: 1.829 m (6').    Weight as of this encounter: 95.7 kg (211 lb).     Weight management plan: Discussed healthy diet and exercise guidelines    He reports that he has never smoked. He has never used smokeless tobacco.      Counseling Resources:  ATP IV Guidelines  Pooled Cohorts Equation Calculator  FRAX Risk Assessment  ICSI Preventive Guidelines  Dietary Guidelines for Americans, 2010  USDA's MyPlate  ASA Prophylaxis  Lung CA Screening    Saul Peralta MD  [facility]

## 2020-11-05 ENCOUNTER — ANCILLARY PROCEDURE (OUTPATIENT)
Dept: GENERAL RADIOLOGY | Facility: CLINIC | Age: 60
End: 2020-11-05
Attending: FAMILY MEDICINE
Payer: COMMERCIAL

## 2020-11-05 ENCOUNTER — OFFICE VISIT (OUTPATIENT)
Dept: URGENT CARE | Facility: URGENT CARE | Age: 60
End: 2020-11-05
Payer: COMMERCIAL

## 2020-11-05 VITALS
BODY MASS INDEX: 29.08 KG/M2 | TEMPERATURE: 98.9 F | OXYGEN SATURATION: 98 % | WEIGHT: 214.4 LBS | SYSTOLIC BLOOD PRESSURE: 118 MMHG | HEART RATE: 88 BPM | RESPIRATION RATE: 16 BRPM | DIASTOLIC BLOOD PRESSURE: 62 MMHG

## 2020-11-05 DIAGNOSIS — S99.911A ANKLE INJURY, RIGHT, INITIAL ENCOUNTER: Primary | ICD-10-CM

## 2020-11-05 PROCEDURE — 99213 OFFICE O/P EST LOW 20 MIN: CPT | Performed by: FAMILY MEDICINE

## 2020-11-05 PROCEDURE — 73610 X-RAY EXAM OF ANKLE: CPT | Mod: RT | Performed by: RADIOLOGY

## 2020-11-05 NOTE — PROGRESS NOTES
Subjective:   Tariq Perez is a 59 year old male who presents for   Chief Complaint   Patient presents with     Urgent Care     Fall     right ankle pain from tripping on the stairs going down.     Approximately 3 hours ago patient missed the last step going down to his basement and rolled onto the lateral side of his right ankle.  He has been able to walk and bear weight on this area but having ongoing discomfort.  He has tried ice due to the present swelling.  No previous fractures or sprained ligaments.      Patient Active Problem List    Diagnosis Date Noted     BP (high blood pressure) 10/21/2019     Priority: Medium     Recurrent major depressive disorder, in full remission (H) 09/16/2016     Priority: Medium     GERD (gastroesophageal reflux disease) 02/20/2012     Priority: Medium     CARDIOVASCULAR SCREENING; LDL GOAL LESS THAN 160 10/31/2010     Priority: Medium       Current Outpatient Medications   Medication     sertraline (ZOLOFT) 100 MG tablet     tadalafil (CIALIS) 5 MG tablet     No current facility-administered medications for this visit.        ROS:  As above per HPI    Objective:   /62   Pulse 88   Temp 98.9  F (37.2  C) (Oral)   Resp 16   Wt 97.3 kg (214 lb 6.4 oz)   SpO2 98%   BMI 29.08 kg/m  , Body mass index is 29.08 kg/m .  Gen:  NAD, well-nourished, sitting in chair comfortably  HEENT: EOMI, sclera anicteric, Head normocephalic, ; nares patent; moist mucous membranes  Neck: trachea midline, no thyromegaly  CV:  Hemodynamically stable  Pulm:  no increased work of breathing   Extrem: no cyanosis, edema or clubbing  Skin: no obvious rashes or abnormalities  Psych: Euthymic, linear thoughts, normal rate of speech  R foot: no pain of navicular or 5th metatarsal  R ankle: lateral ankle swelling and pain palpated around around lateral malleolus  Gait: able to bear weight, antalgic    No results found for any visits on 11/05/20.  XR ankle 3 views: small lesion on the lateral dome of  the talus, otherwise no obvious fracture of the malleolus, soft tissue swelling lateral per my read    Assessment & Plan:   Tariq Perez, 59 year old male who presents with:    Ankle injury, right, initial encounter  Patient weight bearing and has a stable gait, feels comfortable walking. Ice, rest, elevation recommended. ACE wrap was applied. Encouraged using ankle brace once swelling subsides to help reduce likelihood of re-injury.   - XR Ankle Right G/E 3 Views      Les Alegria MD   [place] [facility]    The use of Dragon/LiquidCompass dictation services may have been used to construct the content in this note; any grammatical or spelling errors are non-intentional. Please contact the author of this note directly if you are in need of any clarification.

## 2020-11-05 NOTE — PATIENT INSTRUCTIONS
Elevate leg when at rest to help reduce swelling      Ice every 1-2 hours for 10-15 minutes for first 2 days      Ibuprofen 600mg and Tylenol 500-650mg every 4-6 hours as needed for pain      We will call you in the next day if radiology notes any significant abnormalities      Ankle brace can be helpful with offering support during the healing period over the next couple weeks (but after swelling has come down)

## 2021-08-11 ENCOUNTER — OFFICE VISIT (OUTPATIENT)
Dept: PODIATRY | Facility: CLINIC | Age: 61
End: 2021-08-11
Payer: COMMERCIAL

## 2021-08-11 VITALS
SYSTOLIC BLOOD PRESSURE: 122 MMHG | BODY MASS INDEX: 29.12 KG/M2 | WEIGHT: 215 LBS | HEIGHT: 72 IN | DIASTOLIC BLOOD PRESSURE: 86 MMHG

## 2021-08-11 DIAGNOSIS — M79.671 RIGHT FOOT PAIN: Primary | ICD-10-CM

## 2021-08-11 DIAGNOSIS — L60.3 DYSTROPHIC NAIL: ICD-10-CM

## 2021-08-11 DIAGNOSIS — B35.1 ONYCHOMYCOSIS OF GREAT TOE: ICD-10-CM

## 2021-08-11 PROCEDURE — 99213 OFFICE O/P EST LOW 20 MIN: CPT | Mod: 25 | Performed by: PODIATRIST

## 2021-08-11 PROCEDURE — 11730 AVULSION NAIL PLATE SIMPLE 1: CPT | Mod: T5 | Performed by: PODIATRIST

## 2021-08-11 RX ORDER — KETOCONAZOLE 20 MG/G
CREAM TOPICAL DAILY
Qty: 30 G | Refills: 6 | Status: SHIPPED | OUTPATIENT
Start: 2021-08-11 | End: 2022-12-08

## 2021-08-11 ASSESSMENT — MIFFLIN-ST. JEOR: SCORE: 1828.23

## 2021-08-11 NOTE — PATIENT INSTRUCTIONS
Thank you for choosing [facility] Podiatry / Foot & Ankle Surgery!    DR. DSOUZA'S CLINIC:  [facility] SCHEDULE SURGERY: [phone] [address] BILLING QUESTIONS: [phone]   [place] [address] APPOINTMENTS: [phone]   PH: [phone] CONSUMER PRICE LINE:[phone]   FAX: [phone]      Follow up: as needed    Next steps: follow soaking instructions below until nail site is completely healed.    TOENAIL REMOVAL AFTERCARE     Go directly home and elevate the affected foot on one or two pillows for the remainder of the day/evening if possible. Your toe may stay numb anywhere from 2-8 hours.     Take Tylenol, ibuprofen or another anti-inflammatory as needed for pain.     Take antibiotic if that has been prescribed. Finish the entire prescribed antibiotic even if your symptoms have improved.     The evening of the procedure, soak/wash the affected area in warm water (you may add Epsom salt) for 5 to 10 minutes. Do this twice a day for 2-4 weeks (6-8 weeks if you had phenol) (you may count showering/bathing as one soak).  After soaks, pat the area dry and then allow to airdry for a few minutes. Apply antibiotic ointment to the area and cover with 2 X 2 gauze and paper tape or band-aid.    You may pursue everyday activities as tolerated with either an open toe shoe or cut-out shoe as needed or you may wear regular shoes if no pain is noted.    Watch for any signs and symptoms of infection such as: redness, red streaks going up the foot/leg, swelling, pus or foul odor. Those that have had the phenol procedure, the toe will drain longer and will look like it is infected because it is a chemical burn.     Please call with questions.

## 2021-08-11 NOTE — LETTER
8/11/2021         RE: Tariq Perez  [address] [place] [address]        Dear Colleague,    Thank you for referring your patient, Tariq Perez, to the [facility]. Please see a copy of my visit note below.    Podiatry / Foot and Ankle Surgery Progress Note    August 11, 2021    Subject: Patient was seen for follow up on thickened fungal right great toenail.  He notes that previous treatments that helped his other nails except for his right great toenail he like to try to remove that to see if it will grow back normal at this time.  He denies fever, nausea, vomiting.  Notes that it is painful with pressure and can be 3 out of 10 in shoes.  Denies specific injury.    Objective:  Vitals: /86   Ht 1.829 m (6')   Wt 97.5 kg (215 lb)   BMI 29.16 kg/m    BMI= Body mass index is 29.16 kg/m .      General:  Patient is alert and orientated.  NAD.    Vascular:  DP and PT pulses are palpable.  No edema or varicosities noted.  CFT's < 3secs.  Skin temp is normal.    Neuro:  Light and gross touch sensation intact to digits, dorsum, and plantar aspects of the feet.    Derm: Right great toenail is thickened, dystrophic, presents with subungual debris.  No redness, dehiscence or signs of infection noted.  Pain on palpation.    Musculoskeletal:  No foot deformity noted.      Assessment:    Right foot pain  Dystrophic nail  Onychomycosis of great toe    Medical Decision Making/Plan: Discussed causes and treatments of nail fungus.  Explained that even if a culture comes back negative, a patient could still have nail fungus.  Discussed treatment options with patient and explained that there isn't one treatment that is 100% effective.  Discussed oral lamisil which is the most effective at about 70% but which can have liver effects.  Explained that if she wanted to try this that she would need serial blood draws to test her liver function.  Discussed over the counter  antifungal creams.  Explained that these are about 50% effective and need to be applied once a day for about 6-8months.  Also talked about prescription penlac which is a nail laquer.  Again this is also only 50% effective.  Also discussed that if there was damage to the nail and the nail is now dystrophic that non of the above is going to change the nail.  If there was damage, there is note anything that can be done for the nail to correct it.  Discussed that if it becomes painful, we can remove the nail in clinic.        At this time patient would like the nail removed.  The is going to soak the foot twice a day for 2 weeks and then after the 2 weeks of soaking he will apply an antifungal cream daily to try to help the new nail growing in more normal.  Discussed that there is no guarantees with this.  Patient understands.  All questions were answered to patient satisfaction questions or concerns    Procedure: After verbal consent, the right big toe was anesthetized with 5cc's of 1% lidocaine plain. A tourniquet was applied to the toe. The entire nail was released from the nail bed with an elevator and then removed completely with a hemostat.  Bacitracin was applied to the nail bed.  The tourniquet was removed.  Bandage was applied to the toe.  The patient tolerated the procedure and anesthesia well.    Patient Risk Factor:  Patient is a low risk factor for infection.     Arlene Grubbs DPM, Podiatry/Foot and Ankle Surgery        Again, thank you for allowing me to participate in the care of your patient.        Sincerely,        Arlene Grubbs DPM, Podiatry/Foot and Ankle Surgery

## 2021-08-11 NOTE — PROGRESS NOTES
Podiatry / Foot and Ankle Surgery Progress Note    August 11, 2021    Subject: Patient was seen for follow up on thickened fungal right great toenail.  He notes that previous treatments that helped his other nails except for his right great toenail he like to try to remove that to see if it will grow back normal at this time.  He denies fever, nausea, vomiting.  Notes that it is painful with pressure and can be 3 out of 10 in shoes.  Denies specific injury.    Objective:  Vitals: /86   Ht 1.829 m (6')   Wt 97.5 kg (215 lb)   BMI 29.16 kg/m    BMI= Body mass index is 29.16 kg/m .      General:  Patient is alert and orientated.  NAD.    Vascular:  DP and PT pulses are palpable.  No edema or varicosities noted.  CFT's < 3secs.  Skin temp is normal.    Neuro:  Light and gross touch sensation intact to digits, dorsum, and plantar aspects of the feet.    Derm: Right great toenail is thickened, dystrophic, presents with subungual debris.  No redness, dehiscence or signs of infection noted.  Pain on palpation.    Musculoskeletal:  No foot deformity noted.      Assessment:    Right foot pain  Dystrophic nail  Onychomycosis of great toe    Medical Decision Making/Plan: Discussed causes and treatments of nail fungus.  Explained that even if a culture comes back negative, a patient could still have nail fungus.  Discussed treatment options with patient and explained that there isn't one treatment that is 100% effective.  Discussed oral lamisil which is the most effective at about 70% but which can have liver effects.  Explained that if she wanted to try this that she would need serial blood draws to test her liver function.  Discussed over the counter antifungal creams.  Explained that these are about 50% effective and need to be applied once a day for about 6-8months.  Also talked about prescription penlac which is a nail laquer.  Again this is also only 50% effective.  Also discussed that if there was damage to the  nail and the nail is now dystrophic that non of the above is going to change the nail.  If there was damage, there is note anything that can be done for the nail to correct it.  Discussed that if it becomes painful, we can remove the nail in clinic.        At this time patient would like the nail removed.  The is going to soak the foot twice a day for 2 weeks and then after the 2 weeks of soaking he will apply an antifungal cream daily to try to help the new nail growing in more normal.  Discussed that there is no guarantees with this.  Patient understands.  All questions were answered to patient satisfaction questions or concerns    Procedure: After verbal consent, the right big toe was anesthetized with 5cc's of 1% lidocaine plain. A tourniquet was applied to the toe. The entire nail was released from the nail bed with an elevator and then removed completely with a hemostat.  Bacitracin was applied to the nail bed.  The tourniquet was removed.  Bandage was applied to the toe.  The patient tolerated the procedure and anesthesia well.    Patient Risk Factor:  Patient is a low risk factor for infection.     Arlene Grubbs DPM, Podiatry/Foot and Ankle Surgery

## 2021-08-31 ASSESSMENT — ENCOUNTER SYMPTOMS
CONSTIPATION: 0
SHORTNESS OF BREATH: 0
HEMATURIA: 0
PALPITATIONS: 0
ABDOMINAL PAIN: 0
NAUSEA: 0
PARESTHESIAS: 0
EYE PAIN: 0
SORE THROAT: 0
DIARRHEA: 0
DYSURIA: 0
COUGH: 0
DIZZINESS: 0
FREQUENCY: 1
HEADACHES: 0
HEMATOCHEZIA: 0
MYALGIAS: 0
NERVOUS/ANXIOUS: 0
JOINT SWELLING: 0
WEAKNESS: 0
FEVER: 0
CHILLS: 0

## 2021-09-04 ENCOUNTER — HEALTH MAINTENANCE LETTER (OUTPATIENT)
Age: 61
End: 2021-09-04

## 2021-09-07 ENCOUNTER — OFFICE VISIT (OUTPATIENT)
Dept: FAMILY MEDICINE | Facility: CLINIC | Age: 61
End: 2021-09-07
Payer: COMMERCIAL

## 2021-09-07 VITALS
DIASTOLIC BLOOD PRESSURE: 80 MMHG | OXYGEN SATURATION: 97 % | BODY MASS INDEX: 28.85 KG/M2 | WEIGHT: 213 LBS | HEART RATE: 80 BPM | TEMPERATURE: 97.8 F | RESPIRATION RATE: 16 BRPM | HEIGHT: 72 IN | SYSTOLIC BLOOD PRESSURE: 116 MMHG

## 2021-09-07 DIAGNOSIS — Z00.00 ROUTINE GENERAL MEDICAL EXAMINATION AT A HEALTH CARE FACILITY: Primary | ICD-10-CM

## 2021-09-07 DIAGNOSIS — N52.2 DRUG-INDUCED ERECTILE DYSFUNCTION: ICD-10-CM

## 2021-09-07 DIAGNOSIS — N40.1 BENIGN NON-NODULAR PROSTATIC HYPERPLASIA WITH LOWER URINARY TRACT SYMPTOMS: ICD-10-CM

## 2021-09-07 DIAGNOSIS — F33.42 RECURRENT MAJOR DEPRESSIVE DISORDER, IN FULL REMISSION (H): ICD-10-CM

## 2021-09-07 PROCEDURE — 99386 PREV VISIT NEW AGE 40-64: CPT | Mod: 25 | Performed by: FAMILY MEDICINE

## 2021-09-07 PROCEDURE — 90471 IMMUNIZATION ADMIN: CPT | Performed by: FAMILY MEDICINE

## 2021-09-07 PROCEDURE — 90715 TDAP VACCINE 7 YRS/> IM: CPT | Performed by: FAMILY MEDICINE

## 2021-09-07 RX ORDER — TADALAFIL 5 MG/1
5 TABLET ORAL DAILY
Qty: 90 TABLET | Refills: 3 | Status: SHIPPED | OUTPATIENT
Start: 2021-10-07 | End: 2022-12-08

## 2021-09-07 ASSESSMENT — PATIENT HEALTH QUESTIONNAIRE - PHQ9
SUM OF ALL RESPONSES TO PHQ QUESTIONS 1-9: 0
5. POOR APPETITE OR OVEREATING: NOT AT ALL

## 2021-09-07 ASSESSMENT — ENCOUNTER SYMPTOMS
CHILLS: 0
SHORTNESS OF BREATH: 0
DYSURIA: 0
DIARRHEA: 0
PALPITATIONS: 0
HEADACHES: 0
NAUSEA: 0
JOINT SWELLING: 0
NERVOUS/ANXIOUS: 0
CONSTIPATION: 0
EYE PAIN: 0
DIZZINESS: 0
WEAKNESS: 0
FREQUENCY: 1
COUGH: 0
MYALGIAS: 0
FEVER: 0
SORE THROAT: 0
ABDOMINAL PAIN: 0
HEMATOCHEZIA: 0
HEMATURIA: 0
PARESTHESIAS: 0

## 2021-09-07 ASSESSMENT — ANXIETY QUESTIONNAIRES
1. FEELING NERVOUS, ANXIOUS, OR ON EDGE: NOT AT ALL
7. FEELING AFRAID AS IF SOMETHING AWFUL MIGHT HAPPEN: NOT AT ALL
GAD7 TOTAL SCORE: 0
5. BEING SO RESTLESS THAT IT IS HARD TO SIT STILL: NOT AT ALL
IF YOU CHECKED OFF ANY PROBLEMS ON THIS QUESTIONNAIRE, HOW DIFFICULT HAVE THESE PROBLEMS MADE IT FOR YOU TO DO YOUR WORK, TAKE CARE OF THINGS AT HOME, OR GET ALONG WITH OTHER PEOPLE: NOT DIFFICULT AT ALL
6. BECOMING EASILY ANNOYED OR IRRITABLE: NOT AT ALL
2. NOT BEING ABLE TO STOP OR CONTROL WORRYING: NOT AT ALL
3. WORRYING TOO MUCH ABOUT DIFFERENT THINGS: NOT AT ALL

## 2021-09-07 ASSESSMENT — MIFFLIN-ST. JEOR: SCORE: 1811.22

## 2021-09-07 NOTE — PROGRESS NOTES
SUBJECTIVE:   CC: Tariq Perez is an 60 year old male who presents for preventative health visit.     Patient has been advised of split billing requirements and indicates understanding: Yes  Healthy Habits:     Getting at least 3 servings of Calcium per day:  Yes    Bi-annual eye exam:  Yes    Dental care twice a year:  Yes    Sleep apnea or symptoms of sleep apnea:  None    Diet:  Regular (no restrictions)    Frequency of exercise:  4-5 days/week    Duration of exercise:  30-45 minutes    Taking medications regularly:  Yes    Medication side effects:  None    PHQ-2 Total Score: 0    Additional concerns today:  No    Today's PHQ-2 Score:   PHQ-2 ( 1999 Pfizer) 8/31/2021   Q1: Little interest or pleasure in doing things 0   Q2: Feeling down, depressed or hopeless 0   PHQ-2 Score 0   Q1: Little interest or pleasure in doing things Not at all   Q2: Feeling down, depressed or hopeless Not at all   PHQ-2 Score 0     Abuse: Current or Past(Physical, Sexual or Emotional)- No  Do you feel safe in your environment? Yes    Social History     Tobacco Use     Smoking status: Never Smoker     Smokeless tobacco: Never Used   Substance Use Topics     Alcohol use: Yes     Alcohol/week: 0.0 standard drinks     Comment: rare occasion     If you drink alcohol do you typically have >3 drinks per day or >7 drinks per week? No    Alcohol Use 8/31/2021   Prescreen: >3 drinks/day or >7 drinks/week? No   Prescreen: >3 drinks/day or >7 drinks/week? -   No flowsheet data found.    Last PSA:   PSA   Date Value Ref Range Status   10/07/2020 1.12 0 - 4 ug/L Final     Comment:     Assay Method:  Chemiluminescence using Siemens Vista analyzer     Reviewed orders with patient. Reviewed health maintenance and updated orders accordingly - Yes     Reviewed and updated as needed this visit by clinical staff  Tobacco  Allergies  Meds   Med Hx  Surg Hx  Fam Hx  Soc Hx      Reviewed and updated as needed this visit by Provider     Work - retired.  "Needed provider closer to home.   Exercise - elliptical. Walking. Slow jogging with puppy. Biking.   Lives with his wife.   History of back surgery in 1990. Urethra surgery when 10.     zoloft - seeing therapist. Tapering off from zoloft. Seeing psychiatrist.     Review of Systems   Constitutional: Negative for chills and fever.   HENT: Negative for congestion, ear pain, hearing loss and sore throat.    Eyes: Negative for pain and visual disturbance.   Respiratory: Negative for cough and shortness of breath.    Cardiovascular: Negative for chest pain, palpitations and peripheral edema.   Gastrointestinal: Negative for abdominal pain, constipation, diarrhea, hematochezia and nausea.   Genitourinary: Positive for frequency and impotence. Negative for discharge, dysuria, genital sores, hematuria and urgency.   Musculoskeletal: Negative for joint swelling and myalgias.   Skin: Negative for rash.   Neurological: Negative for dizziness, weakness, headaches and paresthesias.   Psychiatric/Behavioral: Negative for mood changes. The patient is not nervous/anxious.      OBJECTIVE:   /80 (BP Location: Right arm, Patient Position: Sitting, Cuff Size: Adult Regular)   Pulse 80   Temp 97.8  F (36.6  C) (Oral)   Resp 16   Ht 1.816 m (5' 11.5\")   Wt 96.6 kg (213 lb)   SpO2 97%   BMI 29.29 kg/m      Physical Exam  GENERAL: healthy, alert and no distress  EYES: Eyes grossly normal to inspection, PERRL and conjunctivae and sclerae normal  HENT: ear canals and TM's normal, nose and mouth without ulcers or lesions  NECK: no adenopathy, no asymmetry, masses, or scars and thyroid normal to palpation  RESP: lungs clear to auscultation - no rales, rhonchi or wheezes  CV: regular rate and rhythm, normal S1 S2, no S3 or S4, no murmur, click or rub, no peripheral edema and peripheral pulses strong  ABDOMEN: soft, nontender, no hepatosplenomegaly, no masses and bowel sounds normal   (male): normal male genitalia without lesions or " urethral discharge, no hernia  MS: no gross musculoskeletal defects noted, no edema  SKIN: no suspicious lesions or rashes  NEURO: Normal strength and tone, mentation intact and speech normal  PSYCH: mentation appears normal, affect normal/bright    ASSESSMENT/PLAN:   (Z00.00) Routine general medical examination at a health care facility  (primary encounter diagnosis)  Comment:    Plan: agreed to hold off on labs. Reviewed previous labs. miic info is restricted?. He is uptodate with immunization.      (F33.42) Recurrent major depressive disorder, in full remission (H)  Comment:    Plan: seeing psychiatrist. Doing well. Tapering off from ssri.    (N40.1) Benign non-nodular prostatic hyperplasia with lower urinary tract symptoms  Comment: Patient is tolerating current medication without any major side effects of concerns and current dose seems reasonable too.  Current medication regime is effective. Continue current treatment without any changes.   Plan: tadalafil (CIALIS) 5 MG tablet           (N52.2) Drug-induced erectile dysfunction  Comment:  Patient is tolerating current medication without any major side effects of concerns and current dose seems reasonable too.  Current medication regime is effective. Continue current treatment without any changes.   Plan: tadalafil (CIALIS) 5 MG tablet               Patient has been advised of split billing requirements and indicates understanding: No  COUNSELING:   Reviewed preventive health counseling, as reflected in patient instructions  Special attention given to:        Regular exercise       Healthy diet/nutrition       Vision screening       Hearing screening       Colon cancer screening       Prostate cancer screening    Estimated body mass index is 29.16 kg/m  as calculated from the following:    Height as of 8/11/21: 1.829 m (6').    Weight as of 8/11/21: 97.5 kg (215 lb).     Weight management plan: Discussed healthy diet and exercise guidelines    He reports that  he has never smoked. He has never used smokeless tobacco.    Counseling Resources:  ATP IV Guidelines  Pooled Cohorts Equation Calculator  FRAX Risk Assessment  ICSI Preventive Guidelines  Dietary Guidelines for Americans, 2010  USDA's MyPlate  ASA Prophylaxis  Lung CA Screening    Darren Tenorio MD  [facility]

## 2021-09-07 NOTE — NURSING NOTE
Prior to immunization administration, verified patients identity using patient s name and date of birth. Please see Immunization Activity for additional information.     Screening Questionnaire for Adult Immunization    Are you sick today?   No   Do you have allergies to medications, food, a vaccine component or latex?   No   Have you ever had a serious reaction after receiving a vaccination?   No   Do you have a long-term health problem with heart, lung, kidney, or metabolic disease (e.g., diabetes), asthma, a blood disorder, no spleen, complement component deficiency, a cochlear implant, or a spinal fluid leak?  Are you on long-term aspirin therapy?   No   Do you have cancer, leukemia, HIV/AIDS, or any other immune system problem?   No   Do you have a parent, brother, or sister with an immune system problem?   No   In the past 3 months, have you taken medications that affect  your immune system, such as prednisone, other steroids, or anticancer drugs; drugs for the treatment of rheumatoid arthritis, Crohn s disease, or psoriasis; or have you had radiation treatments?   No   Have you had a seizure, or a brain or other nervous system problem?   No   During the past year, have you received a transfusion of blood or blood    products, or been given immune (gamma) globulin or antiviral drug?   No   For women: Are you pregnant or is there a chance you could become       pregnant during the next month?   No   Have you received any vaccinations in the past 4 weeks?   No     Immunization questionnaire answers were all negative.        Per orders of Dr. Tenorio, injection of tdap (adacel) given by Kenna Black. Patient instructed to remain in clinic for 15 minutes afterwards, and to report any adverse reaction to me immediately.     Clinic Administered Medication Documentation          Injectable Medication Documentation    Patient was given tdap. Prior to medication administration, verified patients identity using  patient s name and date of birth. Please see MAR and medication order for additional information. Patient instructed to remain in clinic for 15 minutes.      Was entire vial of medication used? Yes  Vial/Syringe: Syringe  Was this medication supplied by the patient? No    Screening performed by Kenna Black on 9/7/2021 at 9:57 AM.

## 2021-09-08 ASSESSMENT — ANXIETY QUESTIONNAIRES: GAD7 TOTAL SCORE: 0

## 2021-10-27 ENCOUNTER — MYC MEDICAL ADVICE (OUTPATIENT)
Dept: FAMILY MEDICINE | Facility: CLINIC | Age: 61
End: 2021-10-27

## 2022-10-22 ENCOUNTER — HEALTH MAINTENANCE LETTER (OUTPATIENT)
Age: 62
End: 2022-10-22

## 2022-11-23 PROBLEM — I10 BP (HIGH BLOOD PRESSURE): Status: RESOLVED | Noted: 2019-10-21 | Resolved: 2022-11-23

## 2022-11-23 RX ORDER — TRAZODONE HYDROCHLORIDE 100 MG/1
TABLET ORAL
COMMUNITY
Start: 2022-08-11 | End: 2022-12-08

## 2022-11-23 RX ORDER — QUETIAPINE FUMARATE 50 MG/1
TABLET, FILM COATED ORAL
COMMUNITY
Start: 2022-08-03 | End: 2022-12-08

## 2022-12-03 ASSESSMENT — ENCOUNTER SYMPTOMS
WEAKNESS: 0
JOINT SWELLING: 0
NERVOUS/ANXIOUS: 1
HEARTBURN: 1
NAUSEA: 0
DYSURIA: 0
SHORTNESS OF BREATH: 0
COUGH: 0
PARESTHESIAS: 0
ARTHRALGIAS: 0
SORE THROAT: 0
ABDOMINAL PAIN: 1
DIARRHEA: 0
MYALGIAS: 0
HEMATURIA: 0
HEMATOCHEZIA: 0
CHILLS: 0
FEVER: 0
FREQUENCY: 1
DIZZINESS: 0
EYE PAIN: 0
HEADACHES: 1
PALPITATIONS: 0
CONSTIPATION: 0

## 2022-12-03 ASSESSMENT — PATIENT HEALTH QUESTIONNAIRE - PHQ9
SUM OF ALL RESPONSES TO PHQ QUESTIONS 1-9: 7
10. IF YOU CHECKED OFF ANY PROBLEMS, HOW DIFFICULT HAVE THESE PROBLEMS MADE IT FOR YOU TO DO YOUR WORK, TAKE CARE OF THINGS AT HOME, OR GET ALONG WITH OTHER PEOPLE: SOMEWHAT DIFFICULT
SUM OF ALL RESPONSES TO PHQ QUESTIONS 1-9: 7

## 2022-12-08 ENCOUNTER — OFFICE VISIT (OUTPATIENT)
Dept: FAMILY MEDICINE | Facility: CLINIC | Age: 62
End: 2022-12-08
Payer: COMMERCIAL

## 2022-12-08 VITALS
DIASTOLIC BLOOD PRESSURE: 87 MMHG | OXYGEN SATURATION: 95 % | HEART RATE: 93 BPM | SYSTOLIC BLOOD PRESSURE: 128 MMHG | BODY MASS INDEX: 26.95 KG/M2 | WEIGHT: 199 LBS | TEMPERATURE: 97 F | RESPIRATION RATE: 16 BRPM | HEIGHT: 72 IN

## 2022-12-08 DIAGNOSIS — N52.2 DRUG-INDUCED ERECTILE DYSFUNCTION: ICD-10-CM

## 2022-12-08 DIAGNOSIS — F33.0 MILD EPISODE OF RECURRENT MAJOR DEPRESSIVE DISORDER (H): ICD-10-CM

## 2022-12-08 DIAGNOSIS — Z13.220 SCREENING CHOLESTEROL LEVEL: ICD-10-CM

## 2022-12-08 DIAGNOSIS — Z13.0 SCREENING FOR DEFICIENCY ANEMIA: ICD-10-CM

## 2022-12-08 DIAGNOSIS — Z00.00 ROUTINE MEDICAL EXAM: Primary | ICD-10-CM

## 2022-12-08 DIAGNOSIS — Z13.29 SCREENING FOR ENDOCRINE, METABOLIC AND IMMUNITY DISORDER: ICD-10-CM

## 2022-12-08 DIAGNOSIS — Z13.228 SCREENING FOR ENDOCRINE, METABOLIC AND IMMUNITY DISORDER: ICD-10-CM

## 2022-12-08 DIAGNOSIS — Z12.11 SCREEN FOR COLON CANCER: ICD-10-CM

## 2022-12-08 DIAGNOSIS — Z13.0 SCREENING FOR ENDOCRINE, METABOLIC AND IMMUNITY DISORDER: ICD-10-CM

## 2022-12-08 DIAGNOSIS — Z11.59 NEED FOR HEPATITIS C SCREENING TEST: ICD-10-CM

## 2022-12-08 DIAGNOSIS — N40.1 BENIGN NON-NODULAR PROSTATIC HYPERPLASIA WITH LOWER URINARY TRACT SYMPTOMS: ICD-10-CM

## 2022-12-08 DIAGNOSIS — R19.5 POSITIVE COLORECTAL CANCER SCREENING USING COLOGUARD TEST: Primary | ICD-10-CM

## 2022-12-08 DIAGNOSIS — Z12.5 SCREENING FOR PROSTATE CANCER: ICD-10-CM

## 2022-12-08 DIAGNOSIS — Z11.4 SCREENING FOR HIV (HUMAN IMMUNODEFICIENCY VIRUS): ICD-10-CM

## 2022-12-08 LAB
ANION GAP SERPL CALCULATED.3IONS-SCNC: 13 MMOL/L (ref 7–15)
BUN SERPL-MCNC: 28.1 MG/DL (ref 8–23)
CALCIUM SERPL-MCNC: 9.2 MG/DL (ref 8.8–10.2)
CHLORIDE SERPL-SCNC: 101 MMOL/L (ref 98–107)
CHOLEST SERPL-MCNC: 230 MG/DL
CREAT SERPL-MCNC: 1.04 MG/DL (ref 0.67–1.17)
DEPRECATED HCO3 PLAS-SCNC: 25 MMOL/L (ref 22–29)
ERYTHROCYTE [DISTWIDTH] IN BLOOD BY AUTOMATED COUNT: 12.1 % (ref 10–15)
GFR SERPL CREATININE-BSD FRML MDRD: 82 ML/MIN/1.73M2
GLUCOSE SERPL-MCNC: 103 MG/DL (ref 70–99)
HCT VFR BLD AUTO: 47.7 % (ref 40–53)
HCV AB SERPL QL IA: NONREACTIVE
HDLC SERPL-MCNC: 74 MG/DL
HGB BLD-MCNC: 16.2 G/DL (ref 13.3–17.7)
HIV 1+2 AB+HIV1 P24 AG SERPL QL IA: NONREACTIVE
LDLC SERPL CALC-MCNC: 147 MG/DL
MCH RBC QN AUTO: 31.2 PG (ref 26.5–33)
MCHC RBC AUTO-ENTMCNC: 34 G/DL (ref 31.5–36.5)
MCV RBC AUTO: 92 FL (ref 78–100)
NONHDLC SERPL-MCNC: 156 MG/DL
PLATELET # BLD AUTO: 228 10E3/UL (ref 150–450)
POTASSIUM SERPL-SCNC: 4.6 MMOL/L (ref 3.4–5.3)
PSA SERPL-MCNC: 0.61 NG/ML (ref 0–4.5)
RBC # BLD AUTO: 5.2 10E6/UL (ref 4.4–5.9)
SODIUM SERPL-SCNC: 139 MMOL/L (ref 136–145)
TRIGL SERPL-MCNC: 46 MG/DL
WBC # BLD AUTO: 6.7 10E3/UL (ref 4–11)

## 2022-12-08 PROCEDURE — 85027 COMPLETE CBC AUTOMATED: CPT | Performed by: PHYSICIAN ASSISTANT

## 2022-12-08 PROCEDURE — 99213 OFFICE O/P EST LOW 20 MIN: CPT | Mod: 25 | Performed by: PHYSICIAN ASSISTANT

## 2022-12-08 PROCEDURE — 80061 LIPID PANEL: CPT | Performed by: PHYSICIAN ASSISTANT

## 2022-12-08 PROCEDURE — G0103 PSA SCREENING: HCPCS | Performed by: PHYSICIAN ASSISTANT

## 2022-12-08 PROCEDURE — 87389 HIV-1 AG W/HIV-1&-2 AB AG IA: CPT | Performed by: PHYSICIAN ASSISTANT

## 2022-12-08 PROCEDURE — 36415 COLL VENOUS BLD VENIPUNCTURE: CPT | Performed by: PHYSICIAN ASSISTANT

## 2022-12-08 PROCEDURE — 80048 BASIC METABOLIC PNL TOTAL CA: CPT | Performed by: PHYSICIAN ASSISTANT

## 2022-12-08 PROCEDURE — 99396 PREV VISIT EST AGE 40-64: CPT | Performed by: PHYSICIAN ASSISTANT

## 2022-12-08 PROCEDURE — 86803 HEPATITIS C AB TEST: CPT | Performed by: PHYSICIAN ASSISTANT

## 2022-12-08 RX ORDER — TADALAFIL 5 MG/1
5 TABLET ORAL DAILY
Qty: 90 TABLET | Refills: 3 | Status: SHIPPED | OUTPATIENT
Start: 2022-12-08 | End: 2023-10-16

## 2022-12-08 RX ORDER — ESZOPICLONE 3 MG/1
TABLET, FILM COATED ORAL
COMMUNITY
Start: 2022-09-05 | End: 2024-05-06

## 2022-12-08 RX ORDER — SERTRALINE HYDROCHLORIDE 150 MG/1
CAPSULE ORAL
COMMUNITY
Start: 2022-06-01 | End: 2023-10-16

## 2022-12-08 ASSESSMENT — ENCOUNTER SYMPTOMS
SORE THROAT: 0
HEMATURIA: 0
DIZZINESS: 0
DYSURIA: 0
SHORTNESS OF BREATH: 0
COUGH: 0
JOINT SWELLING: 0
MYALGIAS: 0
NAUSEA: 0
ARTHRALGIAS: 0
HEMATOCHEZIA: 0
HEARTBURN: 1
DIARRHEA: 0
FREQUENCY: 1
PALPITATIONS: 0
WEAKNESS: 0
CONSTIPATION: 0
EYE PAIN: 0
PARESTHESIAS: 0
HEADACHES: 1
FEVER: 0
ABDOMINAL PAIN: 1
NERVOUS/ANXIOUS: 1
CHILLS: 0

## 2022-12-08 ASSESSMENT — PAIN SCALES - GENERAL: PAINLEVEL: NO PAIN (0)

## 2022-12-08 NOTE — PROGRESS NOTES
SUBJECTIVE:   CC: Tariq is an 62 year old who presents for preventative health visit.   Patient has been advised of split billing requirements and indicates understanding: Yes  Healthy Habits:     Getting at least 3 servings of Calcium per day:  Yes    Bi-annual eye exam:  Yes    Dental care twice a year:  Yes    Sleep apnea or symptoms of sleep apnea:  None    Diet:  Other    Frequency of exercise:  2-3 days/week    Duration of exercise:  15-30 minutes    Taking medications regularly:  Yes    Medication side effects:  Other    PHQ-2 Total Score: 4    Additional concerns today:  No    Depression has been recurrent this year, motivation has been difficult  Hasn't been exercising the way he typically does  Following with psychiatry, they are helping with insomnia  Urinary frequency is stable - actually possibly better a bit than what is has been in the past    Today's PHQ-2 Score:   PHQ-2 ( 1999 Pfizer) 12/3/2022   Q1: Little interest or pleasure in doing things 3   Q2: Feeling down, depressed or hopeless 1   PHQ-2 Score 4   PHQ-2 Total Score (12-17 Years)- Positive if 3 or more points; Administer PHQ-A if positive -   Q1: Little interest or pleasure in doing things Nearly every day   Q2: Feeling down, depressed or hopeless Several days   PHQ-2 Score 4     Social History     Tobacco Use     Smoking status: Never     Smokeless tobacco: Never   Substance Use Topics     Alcohol use: Yes     Alcohol/week: 0.0 standard drinks     Comment: rare occasion     Alcohol Use 12/3/2022   Prescreen: >3 drinks/day or >7 drinks/week? Not Applicable   Prescreen: >3 drinks/day or >7 drinks/week? -     Last PSA:   PSA   Date Value Ref Range Status   10/07/2020 1.12 0 - 4 ug/L Final     Comment:     Assay Method:  Chemiluminescence using Siemens Vista analyzer     Reviewed orders with patient. Reviewed health maintenance and updated orders accordingly - Yes    Reviewed and updated as needed this visit by clinical staff   Tobacco   "Allergies  Meds  Problems  Med Hx  Surg Hx  Fam Hx          Reviewed and updated as needed this visit by Provider   Tobacco  Allergies  Meds  Problems  Med Hx  Surg Hx  Fam Hx           Review of Systems   Constitutional: Negative for chills and fever.   HENT: Negative for congestion, ear pain and sore throat.    Eyes: Negative for pain and visual disturbance.   Respiratory: Negative for cough and shortness of breath.    Cardiovascular: Negative for chest pain, palpitations and peripheral edema.   Gastrointestinal: Positive for abdominal pain and heartburn. Negative for constipation, diarrhea, hematochezia and nausea.   Genitourinary: Positive for frequency and impotence. Negative for dysuria, genital sores, hematuria, penile discharge and urgency.   Musculoskeletal: Negative for arthralgias, joint swelling and myalgias.   Skin: Negative for rash.   Neurological: Positive for headaches. Negative for dizziness, weakness and paresthesias.   Psychiatric/Behavioral: Negative for mood changes. The patient is nervous/anxious.        OBJECTIVE:   /87 (BP Location: Right arm, Patient Position: Sitting, Cuff Size: Adult Large)   Pulse 93   Temp 97  F (36.1  C) (Tympanic)   Resp 16   Ht 1.816 m (5' 11.5\")   Wt 90.3 kg (199 lb)   SpO2 95%   BMI 27.37 kg/m      Physical Exam  GENERAL: healthy, alert and no distress  EYES: Eyes grossly normal to inspection, PERRL and conjunctivae and sclerae normal  HENT: ear canals and TM's normal, nose and mouth without ulcers or lesions  NECK: no adenopathy, no asymmetry, masses, or scars and thyroid normal to palpation  RESP: lungs clear to auscultation - no rales, rhonchi or wheezes  CV: regular rate and rhythm, normal S1 S2, no S3 or S4, no murmur, click or rub, no peripheral edema and peripheral pulses strong  ABDOMEN: soft, nontender, no hepatosplenomegaly, no masses and bowel sounds normal  MS: no gross musculoskeletal defects noted, no edema  SKIN: no " suspicious lesions or rashes  NEURO: Normal strength and tone, mentation intact and speech normal  PSYCH: mentation appears normal, affect normal/bright      ASSESSMENT/PLAN:   Tariq was seen today for physical.    Diagnoses and all orders for this visit:    Routine medical exam  -     REVIEW OF HEALTH MAINTENANCE PROTOCOL ORDERS    Mild episode of recurrent major depressive disorder (H) - following with psychiatry    Benign non-nodular prostatic hyperplasia with lower urinary tract symptoms  Drug-induced erectile dysfunction - stable, refills provided  -     tadalafil (CIALIS) 5 MG tablet; Take 1 tablet (5 mg) by mouth daily Never use with nitroglycerin, terazosin or doxazosin.    Screening for deficiency anemia  -     CBC with platelets; Future  -     CBC with platelets    Screening for endocrine, metabolic and immunity disorder  -     Basic metabolic panel; Future  -     Basic metabolic panel    Screening cholesterol level  -     Lipid panel reflex to direct LDL Non-fasting; Future  -     Lipid panel reflex to direct LDL Non-fasting    Screening for HIV (human immunodeficiency virus)  -     HIV Antigen Antibody Combo; Future  -     HIV Antigen Antibody Combo    Need for hepatitis C screening test  -     Hepatitis C Screen Reflex to HCV RNA Quant and Genotype; Future  -     Hepatitis C Screen Reflex to HCV RNA Quant and Genotype    Screen for colon cancer  -     COLOGUARD(EXACT SCIENCES); Future    Screening for prostate cancer  -     PSA, screen; Future  -     PSA, screen    Patient has been advised of split billing requirements and indicates understanding: Yes    COUNSELING:   Reviewed preventive health counseling, as reflected in patient instructions       Regular exercise       Healthy diet/nutrition       Vision screening       Consider Hep C screening for all patients one time for ages 18-79 years       HIV screeninx in teen years, 1x in adult years, and at intervals if high risk       Colorectal cancer  screening       Prostate cancer screening    He reports that he has never smoked. He has never used smokeless tobacco.            Flori Cho PA-C  [facility]  Answers for HPI/ROS submitted by the patient on 12/3/2022  If you checked off any problems, how difficult have these problems made it for you to do your work, take care of things at home, or get along with other people?: Somewhat difficult  PHQ9 TOTAL SCORE: 7

## 2022-12-19 LAB — NONINV COLON CA DNA+OCC BLD SCRN STL QL: POSITIVE

## 2022-12-28 ENCOUNTER — TELEPHONE (OUTPATIENT)
Dept: GASTROENTEROLOGY | Facility: CLINIC | Age: 62
End: 2022-12-28

## 2022-12-28 NOTE — TELEPHONE ENCOUNTER
Screening Questions  BLUE  KIND OF PREP RED  LOCATION [review exclusion criteria] GREEN  SEDATION TYPE    Y  Are you active on mychart?   Flori Cho PA-C  Ordering/Referring Provider?    [employer]  What type of coverage do you have?  N Have you had a positive covid test in the last 14 days?    27.3  1. BMI  [BMI 40+ - review exclusion criteria]            *NEED PAC APPT AT [facility]*     SELF   2. Are you able to give consent for your medical care? [IF NO,RN REVIEW]        N  3. Are you taking any prescription pain medications on a routine schedule?        N 3a. EXTENDED PREP What kind of prescription?     N 4. Do you have any chemical dependencies such as alcohol, street drugs, or methadone?    Y - ANXIETY  5. Do you have any history of post-traumatic stress syndrome, severe anxiety or history of psychosis?      **If yes 3- 5 , please schedule with MAC sedation.**          IF YES TO ANY 6 - 10 - HOSPITAL SETTING ONLY.     N 6.   Do you need assistance transferring?     N 7.   Have you had a heart or lung transplant?    N 8.   Are you currently on dialysis?   N 9.   Do you use daily home oxygen?   N 10. Do you take nitroglycerin?   10a. N If yes, how often?     11. [FEMALES]   Are you currently pregnant?     11a.  If yes, how many weeks? [ Greater than 12 weeks, OR NEEDED]    N 12. [review exclusion criteria]  Do you have any implantable devices in your body (pacemaker, defib, LVAD)?            *NEED PAC APPT AT [facility]*     N 13. Do you have Pulmonary Hypertension?             *NEED PAC APPT AT [facility]*     N 14. In the past 6 months, have you had any heart related issues including cardiomyopathy or heart attack?     N 14a. If yes, did it require cardiac stenting if so when?     N 15. Have you had a stroke or Transient ischemic attack (TIA - aka  mini stroke ) within 6 months?      N 16. Do you have mod to severe Obstructive Sleep Apnea?  [Hospital only - Ok at [facility]]    N 17. Do you have SEVERE AND UNCONTROLLED asthma?  "             *NEED PAC APPT AT [facility]*     N 18. Are you currently taking any blood thinners?     18a. If yes, inform patient to \"follow up w/ ordering provider for bridging instructions.\"    N 19. Do you take the medication Phentermine?    19a. If yes, \"Hold for 7 days before procedure.  Please consult your prescribing provider if you have questions about holding this medication.\"     N  20. Do you have chronic kidney disease?      N  21. Do you have a diagnosis of diabetes?     N  22. On a regular basis do you go 3-5 days between bowel movements?     23. Preferred LOCAL Pharmacy for Pre Prescription    [ LIST ONLY ONE PHARMACY]          [id number] - [place], [place] - [address]        - CLOSING REMINDERS -    Informed patient they will need an adult    Cannot take any type of public or medical transportation alone    Conscious Sedation- Needs  for 6 hours after the procedure       MAC/General-Needs  for 24 hours after procedure    Pre-Procedure Covid test to be completed [ESSC PCR Testing Required]    Confirmed Nurse will call to complete assessment       - SCHEDULING DETAILS -      Hospital Setting Required? If yes, what is the exclusion?:        Additional comments:  TREVER SORIA   Surgeon    02/28/2023 Date of Procedure  MAC  Sedation Type     N PAC / Pre-op Required   Location  [facility]        Type of Procedure Scheduled  Lower Endoscopy [Colonoscopy]      Which Colonoscopy Prep was Sent?     STANDARD GOLYTELY-If you answer yes to questions #8, #20, #21          "

## 2022-12-29 ENCOUNTER — MYC MEDICAL ADVICE (OUTPATIENT)
Dept: FAMILY MEDICINE | Facility: CLINIC | Age: 62
End: 2022-12-29

## 2023-02-14 ENCOUNTER — TELEPHONE (OUTPATIENT)
Dept: GASTROENTEROLOGY | Facility: CLINIC | Age: 63
End: 2023-02-14

## 2023-02-14 DIAGNOSIS — R19.5 POSITIVE COLORECTAL CANCER SCREENING USING COLOGUARD TEST: Primary | ICD-10-CM

## 2023-02-14 RX ORDER — BISACODYL 5 MG/1
TABLET, DELAYED RELEASE ORAL
Qty: 4 TABLET | Refills: 0 | Status: SHIPPED | OUTPATIENT
Start: 2023-02-14 | End: 2023-10-16

## 2023-02-14 NOTE — TELEPHONE ENCOUNTER
Patient scheduled for Colonoscopy  on 2.28.23.     Discuss Covid policy.     Pre op exam scheduled: N/A    Arrival time: 1200. Procedure time 1300    Facility location: [facility]; [address] [place] [address], [address] Floor, [place] [address]    Sedation type: MAC    Anticoagulations? No    Electronic implanted devices? No    Diabetic? No    Indication for procedure: Positive colorectal cancer screening using Cologuard test    Bowel prep recommendation: Standard Golytely     Prep instructions sent via [facility] Bowel prep script sent to    [id number] - [place], [place] - [address]      Pre visit planning completed.    Iva Gill RN  Endoscopy Procedure Pre Assessment RN

## 2023-02-15 NOTE — TELEPHONE ENCOUNTER
Attempted to contact patient regarding upcoming Colonoscopy  procedure on 2.28.23 for pre assessment questions.    Patient states is unable to talk at this time and requests a callback tomorrow after 10 AM.    Will call patient 2.16.23 per his request.    Iva Gill RN  Endoscopy Procedure Pre Assessment RN

## 2023-02-16 NOTE — TELEPHONE ENCOUNTER
Pre assessment questions completed for upcoming Colonoscopy  procedure scheduled on 2/28/23    COVID policy reviewed.     Pre-op scheduled  N/A    Reviewed procedural arrival time 1200, procedure time 1300 and facility location [facility]; [address] [place] [address], [address] Floor, [place] [address]    Designated  policy reviewed. Instructed to have someone stay 24 hours post procedure.     Reviewed procedure prep instructions.     Patient verbalized understanding and had no questions or concerns at this time.    Herminia Witt RN  Endoscopy Procedure Pre Assessment RN

## 2023-02-27 NOTE — H&P
Tariq Perez  [id number]  male  62 year old      Reason for procedure/surgery: History of positive Cologuard, presents for colonoscopy for further evaluation. No personal or family history of CRC.     Patient Active Problem List   Diagnosis     CARDIOVASCULAR SCREENING; LDL GOAL LESS THAN 160     GERD (gastroesophageal reflux disease)     Mild recurrent major depression (H)       Past Surgical History:    Past Surgical History:   Procedure Laterality Date     COLONOSCOPY  3/3/2012    Procedure:COLONOSCOPY; COLONOSCOPY; Surgeon:AL DIXON; Location: GI     DECOMPRESSION LUMBAR ONE LEVEL       TONSILLECTOMY         Past Medical History:   Past Medical History:   Diagnosis Date     Depression      Recurrent major depressive disorder, in full remission (H) 9/16/2016       Social History:   Social History     Tobacco Use     Smoking status: Never     Smokeless tobacco: Never   Substance Use Topics     Alcohol use: Yes     Alcohol/week: 0.0 standard drinks     Comment: rare occasion       Family History:   Family History   Problem Relation Age of Onset     Gastrointestinal Disease Mother         b:1931 gastric reflux     Depression Father         d:age 76 manic depression     Depression Sister         b:1964       Allergies: No Known Allergies    Active Medications:   Current Outpatient Medications   Medication Sig Dispense Refill     bisacodyl (DULCOLAX) 5 MG EC tablet Take 2 tablets at 3 pm the day before your procedure. If your procedure is before 11 am, take 2 additional tablets at 11 pm. If your procedure is after 11 am, take 2 additional tablets at 6 am. For additional instructions refer to your colonoscopy prep instructions. 4 tablet 0     eszopiclone (LUNESTA) 3 MG tablet        polyethylene glycol (GOLYTELY) 236 g suspension The night before the exam at 6 pm drink an 8-ounce glass every 15 minutes until the jug is half empty. If you arrive before 11 AM: Drink the other half of the [employer] jug at 11 PM  night before procedure. If you arrive after 11 AM: Drink the other half of the Eat jug at 6 AM day of procedure. For additional instructions refer to your colonoscopy prep instructions. 4000 mL 0     Sertraline HCl 150 MG CAPS        tadalafil (CIALIS) 5 MG tablet Take 1 tablet (5 mg) by mouth daily Never use with nitroglycerin, terazosin or doxazosin. 90 tablet 3       Systemic Review:   CONSTITUTIONAL: NEGATIVE for fever, chills, change in weight  ENT/MOUTH: NEGATIVE for ear, mouth and throat problems  RESP: NEGATIVE for significant cough or SOB  CV: NEGATIVE for chest pain, palpitations or peripheral edema    Physical Examination:   Vital Signs: There were no vitals taken for this visit.  GENERAL: healthy, alert and no distress  NECK: no adenopathy, no asymmetry, masses, or scars  RESP: lungs clear to auscultation - no rales, rhonchi or wheezes  CV: regular rate and rhythm, normal S1 S2, no S3 or S4, no murmur, click or rub, no peripheral edema and peripheral pulses strong  ABDOMEN: soft, nontender, no hepatosplenomegaly, no masses and bowel sounds normal  MS: no gross musculoskeletal defects noted, no edema    Plan: Appropriate to proceed as scheduled.      Nancy Alex MD  2/27/2023    PCP:  Darren Tenorio

## 2023-02-28 ENCOUNTER — HOSPITAL ENCOUNTER (OUTPATIENT)
Facility: AMBULATORY SURGERY CENTER | Age: 63
Discharge: HOME | End: 2023-02-28
Attending: INTERNAL MEDICINE
Payer: COMMERCIAL

## 2023-02-28 ENCOUNTER — ANESTHESIA (OUTPATIENT)
Dept: SURGERY | Facility: AMBULATORY SURGERY CENTER | Age: 63
End: 2023-02-28
Payer: COMMERCIAL

## 2023-02-28 ENCOUNTER — ANESTHESIA EVENT (OUTPATIENT)
Dept: SURGERY | Facility: AMBULATORY SURGERY CENTER | Age: 63
End: 2023-02-28
Payer: COMMERCIAL

## 2023-02-28 VITALS
RESPIRATION RATE: 14 BRPM | BODY MASS INDEX: 26.33 KG/M2 | SYSTOLIC BLOOD PRESSURE: 135 MMHG | HEART RATE: 85 BPM | OXYGEN SATURATION: 98 % | DIASTOLIC BLOOD PRESSURE: 74 MMHG | HEIGHT: 72 IN | TEMPERATURE: 97 F | WEIGHT: 194.4 LBS

## 2023-02-28 VITALS — HEART RATE: 73 BPM

## 2023-02-28 LAB — COLONOSCOPY: NORMAL

## 2023-02-28 PROCEDURE — 45378 DIAGNOSTIC COLONOSCOPY: CPT | Mod: 33,KX

## 2023-02-28 RX ORDER — SODIUM CHLORIDE, SODIUM LACTATE, POTASSIUM CHLORIDE, CALCIUM CHLORIDE 600; 310; 30; 20 MG/100ML; MG/100ML; MG/100ML; MG/100ML
INJECTION, SOLUTION INTRAVENOUS CONTINUOUS PRN
Status: DISCONTINUED | OUTPATIENT
Start: 2023-02-28 | End: 2023-02-28

## 2023-02-28 RX ORDER — ONDANSETRON 2 MG/ML
4 INJECTION INTRAMUSCULAR; INTRAVENOUS EVERY 6 HOURS PRN
Status: DISCONTINUED | OUTPATIENT
Start: 2023-02-28 | End: 2023-03-01 | Stop reason: HOSPADM

## 2023-02-28 RX ORDER — NALOXONE HYDROCHLORIDE 0.4 MG/ML
0.2 INJECTION, SOLUTION INTRAMUSCULAR; INTRAVENOUS; SUBCUTANEOUS
Status: DISCONTINUED | OUTPATIENT
Start: 2023-02-28 | End: 2023-03-01 | Stop reason: HOSPADM

## 2023-02-28 RX ORDER — FLUMAZENIL 0.1 MG/ML
0.2 INJECTION, SOLUTION INTRAVENOUS
Status: DISCONTINUED | OUTPATIENT
Start: 2023-02-28 | End: 2023-03-01 | Stop reason: HOSPADM

## 2023-02-28 RX ORDER — LIDOCAINE HYDROCHLORIDE 20 MG/ML
INJECTION, SOLUTION INFILTRATION; PERINEURAL PRN
Status: DISCONTINUED | OUTPATIENT
Start: 2023-02-28 | End: 2023-02-28

## 2023-02-28 RX ORDER — PROPOFOL 10 MG/ML
INJECTION, EMULSION INTRAVENOUS PRN
Status: DISCONTINUED | OUTPATIENT
Start: 2023-02-28 | End: 2023-02-28

## 2023-02-28 RX ORDER — PROPOFOL 10 MG/ML
INJECTION, EMULSION INTRAVENOUS CONTINUOUS PRN
Status: DISCONTINUED | OUTPATIENT
Start: 2023-02-28 | End: 2023-02-28

## 2023-02-28 RX ORDER — NALOXONE HYDROCHLORIDE 0.4 MG/ML
0.4 INJECTION, SOLUTION INTRAMUSCULAR; INTRAVENOUS; SUBCUTANEOUS
Status: DISCONTINUED | OUTPATIENT
Start: 2023-02-28 | End: 2023-03-01 | Stop reason: HOSPADM

## 2023-02-28 RX ORDER — ONDANSETRON 2 MG/ML
4 INJECTION INTRAMUSCULAR; INTRAVENOUS
Status: DISCONTINUED | OUTPATIENT
Start: 2023-02-28 | End: 2023-02-28 | Stop reason: HOSPADM

## 2023-02-28 RX ORDER — LIDOCAINE 40 MG/G
CREAM TOPICAL
Status: DISCONTINUED | OUTPATIENT
Start: 2023-02-28 | End: 2023-02-28 | Stop reason: HOSPADM

## 2023-02-28 RX ORDER — PROCHLORPERAZINE MALEATE 10 MG
10 TABLET ORAL EVERY 6 HOURS PRN
Status: DISCONTINUED | OUTPATIENT
Start: 2023-02-28 | End: 2023-03-01 | Stop reason: HOSPADM

## 2023-02-28 RX ORDER — SODIUM CHLORIDE, SODIUM LACTATE, POTASSIUM CHLORIDE, CALCIUM CHLORIDE 600; 310; 30; 20 MG/100ML; MG/100ML; MG/100ML; MG/100ML
INJECTION, SOLUTION INTRAVENOUS CONTINUOUS
Status: DISCONTINUED | OUTPATIENT
Start: 2023-02-28 | End: 2023-02-28 | Stop reason: HOSPADM

## 2023-02-28 RX ORDER — ONDANSETRON 4 MG/1
4 TABLET, ORALLY DISINTEGRATING ORAL EVERY 6 HOURS PRN
Status: DISCONTINUED | OUTPATIENT
Start: 2023-02-28 | End: 2023-03-01 | Stop reason: HOSPADM

## 2023-02-28 RX ADMIN — LIDOCAINE HYDROCHLORIDE 50 MG: 20 INJECTION, SOLUTION INFILTRATION; PERINEURAL at 13:05

## 2023-02-28 RX ADMIN — PROPOFOL 70 MG: 10 INJECTION, EMULSION INTRAVENOUS at 13:05

## 2023-02-28 RX ADMIN — SODIUM CHLORIDE, SODIUM LACTATE, POTASSIUM CHLORIDE, CALCIUM CHLORIDE: 600; 310; 30; 20 INJECTION, SOLUTION INTRAVENOUS at 13:01

## 2023-02-28 RX ADMIN — PROPOFOL 150 MCG/KG/MIN: 10 INJECTION, EMULSION INTRAVENOUS at 13:05

## 2023-02-28 RX ADMIN — PROPOFOL 125 MCG/KG/MIN: 10 INJECTION, EMULSION INTRAVENOUS at 13:20

## 2023-02-28 RX ADMIN — SODIUM CHLORIDE, SODIUM LACTATE, POTASSIUM CHLORIDE, CALCIUM CHLORIDE: 600; 310; 30; 20 INJECTION, SOLUTION INTRAVENOUS at 12:52

## 2023-02-28 NOTE — ANESTHESIA CARE TRANSFER NOTE
Patient: Tariq Perez    Procedure: Procedure(s):  COLONOSCOPY       Diagnosis: Positive colorectal cancer screening using Cologuard test [R19.5]  Diagnosis Additional Information: No value filed.    Anesthesia Type:   No value filed.     Note:    Oropharynx: oropharynx clear of all foreign objects  Level of Consciousness: drowsy  Oxygen Supplementation: room air    Independent Airway: airway patency satisfactory and stable  Dentition: dentition unchanged  Vital Signs Stable: post-procedure vital signs reviewed and stable  Report to RN Given: handoff report given  Patient transferred to: Phase II    Handoff Report: Identifed the Patient, Identified the Reponsible Provider, Reviewed the pertinent medical history, Discussed the surgical course, Reviewed Intra-OP anesthesia mangement and issues during anesthesia, Set expectations for post-procedure period and Allowed opportunity for questions and acknowledgement of understanding      Vitals:  Vitals Value Taken Time   BP     Temp     Pulse 72    Resp 13    SpO2 95%        Electronically Signed By: FABI Cueto CRNA  February 28, 2023  1:35 PM

## 2023-03-03 NOTE — ANESTHESIA PREPROCEDURE EVALUATION
Anesthesia Pre-Procedure Evaluation    Patient: Tariq Perez   MRN: [id number] : 1960        Procedure : Procedure(s):  COLONOSCOPY          Past Medical History:   Diagnosis Date     Depression      Recurrent major depressive disorder, in full remission (H) 2016      Past Surgical History:   Procedure Laterality Date     COLONOSCOPY  3/3/2012    Procedure:COLONOSCOPY; COLONOSCOPY; Surgeon:AL DIXON; Location: GI     COLONOSCOPY N/A 2023    Procedure: COLONOSCOPY;  Surgeon: Nancy Alex MD;  Location: [facility] OR     DECOMPRESSION LUMBAR ONE LEVEL       TONSILLECTOMY        No Known Allergies   Social History     Tobacco Use     Smoking status: Never     Smokeless tobacco: Never   Substance Use Topics     Alcohol use: Yes     Alcohol/week: 0.0 standard drinks     Comment: rare occasion      Wt Readings from Last 1 Encounters:   23 88.2 kg (194 lb 6.4 oz)        Anesthesia Evaluation   Pt has had prior anesthetic.     No history of anesthetic complications       ROS/MED HX  ENT/Pulmonary:  - neg pulmonary ROS     Neurologic:  - neg neurologic ROS     Cardiovascular:  - neg cardiovascular ROS     METS/Exercise Tolerance: >4 METS    Hematologic:       Musculoskeletal:       GI/Hepatic:     (+) GERD,  (-) liver disease   Renal/Genitourinary:    (-) renal disease   Endo:       Psychiatric/Substance Use:     (+) psychiatric history depression     Infectious Disease:       Malignancy:       Other:            Physical Exam    Airway  airway exam normal           Respiratory Devices and Support         Dental       (+) Minor Abnormalities - some fillings, tiny chips      Cardiovascular   cardiovascular exam normal          Pulmonary   pulmonary exam normal                OUTSIDE LABS:  CBC:   Lab Results   Component Value Date    WBC 6.7 2022    WBC 4.3 10/07/2020    HGB 16.2 2022    HGB 14.1 10/07/2020    HCT 47.7 2022    HCT 42.1 10/07/2020     2022    PLT  194 10/07/2020     BMP:   Lab Results   Component Value Date     12/08/2022     10/07/2020    POTASSIUM 4.6 12/08/2022    POTASSIUM 4.3 10/07/2020    CHLORIDE 101 12/08/2022    CHLORIDE 104 10/07/2020    CO2 25 12/08/2022    CO2 26 10/07/2020    BUN 28.1 (H) 12/08/2022    BUN 18 10/07/2020    CR 1.04 12/08/2022    CR 1.08 10/07/2020     (H) 12/08/2022    GLC 83 10/07/2020     COAGS: No results found for: PTT, INR, FIBR  POC: No results found for: BGM, HCG, HCGS  HEPATIC:   Lab Results   Component Value Date    ALBUMIN 3.5 10/07/2020    PROTTOTAL 7.1 10/07/2020    ALT 31 10/07/2020    AST 20 10/07/2020    ALKPHOS 64 10/07/2020    BILITOTAL 0.5 10/07/2020     OTHER:   Lab Results   Component Value Date    A1C 5.2 11/25/2009    YARI 9.2 12/08/2022    TSH 1.20 10/07/2020    T4 1.67 11/25/2009    CRP 0.3 01/23/2006       Anesthesia Plan    ASA Status:  2   NPO Status:  NPO Appropriate    Anesthesia Type: MAC.     - Reason for MAC: straight local not clinically adequate   Induction: Intravenous.   Maintenance: TIVA.        Consents    Anesthesia Plan(s) and associated risks, benefits, and realistic alternatives discussed. Questions answered and patient/representative(s) expressed understanding.    - Discussed:     - Discussed with:  Patient         Postoperative Care       PONV prophylaxis: Ondansetron (or other 5HT-3)     Comments:                Guilherme Cheung MD

## 2023-03-03 NOTE — ANESTHESIA POSTPROCEDURE EVALUATION
Patient: Tariq Perez    Procedure: Procedure(s):  COLONOSCOPY       Anesthesia Type:  MAC    Note:  Disposition: Outpatient   Postop Pain Control: Uneventful            Sign Out: Well controlled pain   PONV: No   Neuro/Psych: Uneventful            Sign Out: Acceptable/Baseline neuro status   Airway/Respiratory: Uneventful            Sign Out: Acceptable/Baseline resp. status   CV/Hemodynamics: Uneventful            Sign Out: Acceptable CV status; No obvious hypovolemia; No obvious fluid overload   Other NRE: NONE   DID A NON-ROUTINE EVENT OCCUR? No           Last vitals:  Vitals Value Taken Time   /74 02/28/23 1349   Temp 36.1  C (97  F) 02/28/23 1349   Pulse 85 02/28/23 1349   Resp 14 02/28/23 1349   SpO2 98 % 02/28/23 1349       Electronically Signed By: Guilherme Cheung MD  March 3, 2023  12:38 AM

## 2023-10-15 ASSESSMENT — ENCOUNTER SYMPTOMS
MYALGIAS: 0
HEMATURIA: 0
COUGH: 1
CHILLS: 0
ABDOMINAL PAIN: 0
HEARTBURN: 0
NAUSEA: 0
HEMATOCHEZIA: 0
FREQUENCY: 1
SHORTNESS OF BREATH: 0
HEADACHES: 0
ARTHRALGIAS: 0
WEAKNESS: 0
CONSTIPATION: 0
DIARRHEA: 0
PARESTHESIAS: 0
PALPITATIONS: 0
NERVOUS/ANXIOUS: 0
DYSURIA: 0
DIZZINESS: 0
FEVER: 0
EYE PAIN: 0
JOINT SWELLING: 0
SORE THROAT: 0

## 2023-10-15 ASSESSMENT — PATIENT HEALTH QUESTIONNAIRE - PHQ9
SUM OF ALL RESPONSES TO PHQ QUESTIONS 1-9: 17
10. IF YOU CHECKED OFF ANY PROBLEMS, HOW DIFFICULT HAVE THESE PROBLEMS MADE IT FOR YOU TO DO YOUR WORK, TAKE CARE OF THINGS AT HOME, OR GET ALONG WITH OTHER PEOPLE: VERY DIFFICULT
SUM OF ALL RESPONSES TO PHQ QUESTIONS 1-9: 17

## 2023-10-16 ENCOUNTER — OFFICE VISIT (OUTPATIENT)
Dept: FAMILY MEDICINE | Facility: CLINIC | Age: 63
End: 2023-10-16
Payer: COMMERCIAL

## 2023-10-16 VITALS
DIASTOLIC BLOOD PRESSURE: 72 MMHG | HEART RATE: 96 BPM | OXYGEN SATURATION: 98 % | BODY MASS INDEX: 26.93 KG/M2 | WEIGHT: 198.8 LBS | TEMPERATURE: 97.3 F | HEIGHT: 72 IN | SYSTOLIC BLOOD PRESSURE: 128 MMHG | RESPIRATION RATE: 20 BRPM

## 2023-10-16 DIAGNOSIS — Z00.00 ROUTINE GENERAL MEDICAL EXAMINATION AT A HEALTH CARE FACILITY: Primary | ICD-10-CM

## 2023-10-16 DIAGNOSIS — N52.2 DRUG-INDUCED ERECTILE DYSFUNCTION: ICD-10-CM

## 2023-10-16 DIAGNOSIS — N40.1 BENIGN NON-NODULAR PROSTATIC HYPERPLASIA WITH LOWER URINARY TRACT SYMPTOMS: ICD-10-CM

## 2023-10-16 DIAGNOSIS — F33.0 MILD EPISODE OF RECURRENT MAJOR DEPRESSIVE DISORDER (H): ICD-10-CM

## 2023-10-16 LAB
ANION GAP SERPL CALCULATED.3IONS-SCNC: 10 MMOL/L (ref 7–15)
BUN SERPL-MCNC: 20.9 MG/DL (ref 8–23)
CALCIUM SERPL-MCNC: 9.6 MG/DL (ref 8.8–10.2)
CHLORIDE SERPL-SCNC: 98 MMOL/L (ref 98–107)
CHOLEST SERPL-MCNC: 187 MG/DL
CREAT SERPL-MCNC: 1.05 MG/DL (ref 0.67–1.17)
DEPRECATED HCO3 PLAS-SCNC: 30 MMOL/L (ref 22–29)
EGFRCR SERPLBLD CKD-EPI 2021: 81 ML/MIN/1.73M2
GLUCOSE SERPL-MCNC: 96 MG/DL (ref 70–99)
HDLC SERPL-MCNC: 68 MG/DL
LDLC SERPL CALC-MCNC: 104 MG/DL
NONHDLC SERPL-MCNC: 119 MG/DL
POTASSIUM SERPL-SCNC: 4.4 MMOL/L (ref 3.4–5.3)
PSA SERPL DL<=0.01 NG/ML-MCNC: 0.67 NG/ML (ref 0–4.5)
SODIUM SERPL-SCNC: 138 MMOL/L (ref 135–145)
TRIGL SERPL-MCNC: 74 MG/DL

## 2023-10-16 PROCEDURE — G0103 PSA SCREENING: HCPCS | Performed by: PHYSICIAN ASSISTANT

## 2023-10-16 PROCEDURE — 80048 BASIC METABOLIC PNL TOTAL CA: CPT | Performed by: PHYSICIAN ASSISTANT

## 2023-10-16 PROCEDURE — 99396 PREV VISIT EST AGE 40-64: CPT | Mod: 25 | Performed by: PHYSICIAN ASSISTANT

## 2023-10-16 PROCEDURE — 80061 LIPID PANEL: CPT | Performed by: PHYSICIAN ASSISTANT

## 2023-10-16 PROCEDURE — 90471 IMMUNIZATION ADMIN: CPT | Performed by: PHYSICIAN ASSISTANT

## 2023-10-16 PROCEDURE — 90682 RIV4 VACC RECOMBINANT DNA IM: CPT | Performed by: PHYSICIAN ASSISTANT

## 2023-10-16 PROCEDURE — 36415 COLL VENOUS BLD VENIPUNCTURE: CPT | Performed by: PHYSICIAN ASSISTANT

## 2023-10-16 PROCEDURE — 90480 ADMN SARSCOV2 VAC 1/ONLY CMP: CPT | Performed by: PHYSICIAN ASSISTANT

## 2023-10-16 PROCEDURE — 91320 SARSCV2 VAC 30MCG TRS-SUC IM: CPT | Performed by: PHYSICIAN ASSISTANT

## 2023-10-16 PROCEDURE — 99213 OFFICE O/P EST LOW 20 MIN: CPT | Mod: 25 | Performed by: PHYSICIAN ASSISTANT

## 2023-10-16 RX ORDER — TADALAFIL 5 MG/1
5 TABLET ORAL DAILY
Qty: 90 TABLET | Refills: 3 | Status: SHIPPED | OUTPATIENT
Start: 2023-10-16

## 2023-10-16 RX ORDER — DESVENLAFAXINE 100 MG/1
100 TABLET, EXTENDED RELEASE ORAL DAILY
COMMUNITY
Start: 2023-10-11 | End: 2024-03-29

## 2023-10-16 ASSESSMENT — ENCOUNTER SYMPTOMS
HEARTBURN: 0
DYSURIA: 0
ABDOMINAL PAIN: 0
COUGH: 1
SORE THROAT: 0
MYALGIAS: 0
JOINT SWELLING: 0
CONSTIPATION: 0
HEADACHES: 0
CHILLS: 0
WEAKNESS: 0
ARTHRALGIAS: 0
FEVER: 0
HEMATURIA: 0
HEMATOCHEZIA: 0
PARESTHESIAS: 0
PALPITATIONS: 0
FREQUENCY: 1
DIZZINESS: 0
EYE PAIN: 0
NAUSEA: 0
DIARRHEA: 0
SHORTNESS OF BREATH: 0
NERVOUS/ANXIOUS: 0

## 2023-10-16 ASSESSMENT — PAIN SCALES - GENERAL: PAINLEVEL: NO PAIN (0)

## 2023-10-16 NOTE — PROGRESS NOTES
SUBJECTIVE:   CC: Tariq is an 62 year old who presents for preventative health visit.       10/16/2023     1:19 PM   Additional Questions   Roomed by Zeenat MILES       Healthy Habits:     Getting at least 3 servings of Calcium per day:  Yes    Bi-annual eye exam:  Yes    Dental care twice a year:  Yes    Sleep apnea or symptoms of sleep apnea:  Daytime drowsiness    Diet:  Regular (no restrictions)    Frequency of exercise:  None    Taking medications regularly:  Yes    Medication side effects:  Other    Additional concerns today:  No      Today's PHQ-9 Score:       10/15/2023    12:59 PM   PHQ-9 SCORE   PHQ-9 Total Score MyChart 17 (Moderately severe depression)   PHQ-9 Total Score 17             Patient with a history of major depressive disorder, currently moderate, being managed by outside psychiatrist and therapist, recent increase in Pristiq and Lunesta dose.    Patient also with a history of BPH and erectile dysfunction, currently stable on tadalafil 5 mg once daily, no medication side effects reported by the patient      Social History     Tobacco Use    Smoking status: Never    Smokeless tobacco: Never   Substance Use Topics    Alcohol use: Yes     Alcohol/week: 0.0 standard drinks of alcohol     Comment: rare occasion             10/15/2023     1:02 PM   Alcohol Use   Prescreen: >3 drinks/day or >7 drinks/week? No          No data to display                Last PSA:   PSA   Date Value Ref Range Status   10/07/2020 1.12 0 - 4 ug/L Final     Comment:     Assay Method:  Chemiluminescence using Siemens Vista analyzer     Prostate Specific Antigen Screen   Date Value Ref Range Status   12/08/2022 0.61 0.00 - 4.50 ng/mL Final       Reviewed orders with patient. Reviewed health maintenance and updated orders accordingly - Yes  BP Readings from Last 3 Encounters:   10/16/23 128/72   02/28/23 135/74   12/08/22 128/87    Wt Readings from Last 3 Encounters:   10/16/23 90.2 kg (198 lb 12.8 oz)   02/28/23 88.2 kg (194 lb  "6.4 oz)   12/08/22 90.3 kg (199 lb)            Reviewed and updated as needed this visit by clinical staff   Tobacco  Allergies  Meds              Reviewed and updated as needed this visit by Provider                     Review of Systems   Constitutional:  Negative for chills and fever.   HENT:  Negative for congestion, ear pain, hearing loss and sore throat.    Eyes:  Negative for pain and visual disturbance.   Respiratory:  Positive for cough. Negative for shortness of breath.    Cardiovascular:  Negative for chest pain, palpitations and peripheral edema.   Gastrointestinal:  Negative for abdominal pain, constipation, diarrhea, heartburn, hematochezia and nausea.   Genitourinary:  Positive for frequency. Negative for dysuria, genital sores, hematuria, impotence, penile discharge and urgency.   Musculoskeletal:  Negative for arthralgias, joint swelling and myalgias.   Skin:  Negative for rash.   Neurological:  Negative for dizziness, weakness, headaches and paresthesias.   Psychiatric/Behavioral:  Negative for mood changes. The patient is not nervous/anxious.          OBJECTIVE:   /72 (BP Location: Right arm, Patient Position: Sitting, Cuff Size: Adult Regular)   Pulse 96   Temp 97.3  F (36.3  C) (Temporal)   Resp 20   Ht 1.816 m (5' 11.5\")   Wt 90.2 kg (198 lb 12.8 oz)   SpO2 98%   BMI 27.34 kg/m      Physical Exam  GENERAL: healthy, alert and no distress  EYES: Eyes grossly normal to inspection, PERRL and conjunctivae and sclerae normal  HENT: ear canals and TM's normal, nose and mouth without ulcers or lesions  NECK: no adenopathy, no asymmetry, masses, or scars and thyroid normal to palpation  RESP: lungs clear to auscultation - no rales, rhonchi or wheezes  CV: regular rate and rhythm, normal S1 S2, no S3 or S4, no murmur, click or rub, no peripheral edema and peripheral pulses strong  ABDOMEN: soft, nontender, no hepatosplenomegaly, no masses and bowel sounds normal  MS: no gross " "musculoskeletal defects noted, no edema  SKIN: no suspicious lesions or rashes  NEURO: Normal strength and tone, mentation intact and speech normal  PSYCH: mentation appears normal, affect normal/bright        ASSESSMENT/PLAN:   Tariq was seen today for physical.    Diagnoses and all orders for this visit:    Routine general medical examination at a health care facility  -     PSA, screen; Future  -     Lipid panel reflex to direct LDL Fasting; Future  -     Basic metabolic panel  (Ca, Cl, CO2, Creat, Gluc, K, Na, BUN); Future  -     PSA, screen  -     Lipid panel reflex to direct LDL Fasting  -     Basic metabolic panel  (Ca, Cl, CO2, Creat, Gluc, K, Na, BUN)    Screening lab work and vaccines updated in clinic today, patient declined RSV vaccine.    Benign non-nodular prostatic hyperplasia with lower urinary tract symptoms  -     tadalafil (CIALIS) 5 MG tablet; Take 1 tablet (5 mg) by mouth daily Never use with nitroglycerin, terazosin or doxazosin.    Drug-induced erectile dysfunction  -     tadalafil (CIALIS) 5 MG tablet; Take 1 tablet (5 mg) by mouth daily Never use with nitroglycerin, terazosin or doxazosin.    Mild episode of recurrent major depressive disorder (H24)  Patient followed by outside provider, psychiatrist and in the future starting talk therapy.    Other orders  -     COVID-19 12+ (2023-24) (PFIZER)  -     INFLUENZA VACCINE 18-64Y (FLUBLOK)  -     PRIMARY CARE FOLLOW-UP SCHEDULING; Future        Patient has been advised of split billing requirements and indicates understanding: Yes      COUNSELING:   Reviewed preventive health counseling, as reflected in patient instructions       Regular exercise       Healthy diet/nutrition      BMI:   Estimated body mass index is 27.34 kg/m  as calculated from the following:    Height as of this encounter: 1.816 m (5' 11.5\").    Weight as of this encounter: 90.2 kg (198 lb 12.8 oz).   Weight management plan: Discussed healthy diet and exercise " guidelines      He reports that he has never smoked. He has never used smokeless tobacco.            Sourav Herr PA-C  [facility]  Answers submitted by the patient for this visit:  Patient Health Questionnaire (Submitted on 10/15/2023)  If you checked off any problems, how difficult have these problems made it for you to do your work, take care of things at home, or get along with other people?: Very difficult  PHQ9 TOTAL SCORE: 17

## 2024-03-29 ENCOUNTER — OFFICE VISIT (OUTPATIENT)
Dept: SLEEP MEDICINE | Facility: CLINIC | Age: 64
End: 2024-03-29
Payer: COMMERCIAL

## 2024-03-29 VITALS
OXYGEN SATURATION: 99 % | RESPIRATION RATE: 16 BRPM | DIASTOLIC BLOOD PRESSURE: 85 MMHG | WEIGHT: 203 LBS | BODY MASS INDEX: 27.5 KG/M2 | HEIGHT: 72 IN | HEART RATE: 92 BPM | SYSTOLIC BLOOD PRESSURE: 123 MMHG

## 2024-03-29 DIAGNOSIS — G47.00 INSOMNIA, UNSPECIFIED TYPE: ICD-10-CM

## 2024-03-29 DIAGNOSIS — R35.1 NOCTURIA: ICD-10-CM

## 2024-03-29 DIAGNOSIS — R06.83 SNORING: ICD-10-CM

## 2024-03-29 DIAGNOSIS — G47.52 DREAM ENACTMENT BEHAVIOR: Primary | ICD-10-CM

## 2024-03-29 PROCEDURE — 99205 OFFICE O/P NEW HI 60 MIN: CPT

## 2024-03-29 RX ORDER — BUPROPION HYDROCHLORIDE 150 MG/1
TABLET ORAL
COMMUNITY
Start: 2024-03-27 | End: 2024-05-06

## 2024-03-29 RX ORDER — FAMOTIDINE 20 MG
TABLET ORAL
COMMUNITY

## 2024-03-29 RX ORDER — DESVENLAFAXINE 50 MG/1
TABLET, FILM COATED, EXTENDED RELEASE ORAL
COMMUNITY
Start: 2024-03-11 | End: 2024-10-04 | Stop reason: ALTCHOICE

## 2024-03-29 ASSESSMENT — SLEEP AND FATIGUE QUESTIONNAIRES
HOW LIKELY ARE YOU TO NOD OFF OR FALL ASLEEP WHILE WATCHING TV: HIGH CHANCE OF DOZING
HOW LIKELY ARE YOU TO NOD OFF OR FALL ASLEEP WHILE SITTING QUIETLY AFTER LUNCH WITHOUT ALCOHOL: MODERATE CHANCE OF DOZING
HOW LIKELY ARE YOU TO NOD OFF OR FALL ASLEEP WHILE SITTING AND TALKING TO SOMEONE: WOULD NEVER DOZE
HOW LIKELY ARE YOU TO NOD OFF OR FALL ASLEEP IN A CAR, WHILE STOPPED FOR A FEW MINUTES IN TRAFFIC: WOULD NEVER DOZE
HOW LIKELY ARE YOU TO NOD OFF OR FALL ASLEEP WHEN YOU ARE A PASSENGER IN A CAR FOR AN HOUR WITHOUT A BREAK: SLIGHT CHANCE OF DOZING
HOW LIKELY ARE YOU TO NOD OFF OR FALL ASLEEP WHILE SITTING INACTIVE IN A PUBLIC PLACE: SLIGHT CHANCE OF DOZING
HOW LIKELY ARE YOU TO NOD OFF OR FALL ASLEEP WHILE SITTING AND READING: SLIGHT CHANCE OF DOZING
HOW LIKELY ARE YOU TO NOD OFF OR FALL ASLEEP WHILE LYING DOWN TO REST IN THE AFTERNOON WHEN CIRCUMSTANCES PERMIT: MODERATE CHANCE OF DOZING

## 2024-03-29 NOTE — PATIENT INSTRUCTIONS
"Tips to safeguard your sleeping environment to prevent injury from dream enactment:    Move hard or sharp objects or clutter away from the bedside (tables, etc).  Put pillows between you and a bed partner.  Use padded side rails on the bed to prevent falling out of bed, or move the mattress to the floor so there is not far to fall.  Pad the floor near the bed  Sleep in a sleeping bag to reduce how far or hard you can kick/punch.  Remove dangerous objects from the bedroom, such as sharp items and weapons  Protect bedroom windows to prevent you from being able to go out the window in your sleep.  Possibly sleep in a separate bed or room from your bed partner until symptoms are controlled            MY TREATMENT INFORMATION FOR SLEEP APNEA-  Tariq Perez    DOCTOR : FABI Rivera CNP    Am I having a sleep study at a sleep center?  --->Due to normal delays, you will be contacted within 2-4 weeks to schedule    Am I having a home sleep study?  --->Watch the video for the device you are using:    -/drop off device- https://www.BzzAgent.com/watch?v=yGGFBdELGhk    Frequently asked questions:  1. What is Obstructive Sleep Apnea (KELVIN)? KELVIN is the most common type of sleep apnea. Apnea means, \"without breath.\"  Apnea is most often caused by narrowing or collapse of the upper airway as muscles relax during sleep.   Almost everyone has occasional apneas. Most people with sleep apnea have had brief interruptions at night frequently for many years.  The severity of sleep apnea is related to how frequent and severe the events are.   2. What are the consequences of KELVIN? Symptoms include: feeling sleepy during the day, snoring loudly, gasping or stopping of breathing, trouble sleeping, and occasionally morning headaches or heartburn at night.  Sleepiness can be serious and even increase the risk of falling asleep while driving. Other health consequences may include development of high blood pressure and other " cardiovascular disease in persons who are susceptible. Untreated KELVIN can contribute to heart disease, stroke and diabetes.   3. What are the treatment options? In most situations, sleep apnea is a lifelong disease that must be managed with daily therapy. Medications are not effective for sleep apnea and surgery is generally not considered until other therapies have been tried. Your treatment is your choice. Continuous Positive Airway (CPAP) works right away and is the therapy that is effective in nearly everyone. An oral device to hold your jaw forward is usually the next most reliable option. Other options include postioning devices (to keep you off your back), weight loss, and surgery including a tongue pacing device. There is more detail about some of these options below.  4. Are my sleep studies covered by insurance? Although we will request verification of coverage, we advise you also check in advance of the study to ensure there is coverage.    Important tips for those choosing CPAP and similar devices  REMEMBER-IF YOU RECEIVE A CALL FROM  [phone]-->IT IS TO SETUP A DEVICE  For new devices, sign up for device YANET to monitor your device for your followup visits  We encourage you to utilize the [facility] yanet or website ([email or web address] [email or web address]/) to monitor your therapy progress and share the data with your healthcare team when you discuss your sleep apnea.                                                    Know your equipment:  CPAP is continuous positive airway pressure that prevents obstructive sleep apnea by keeping the throat from collapsing while you are sleeping. In most cases, the device is  smart  and can slowly self-adjusts if your throat collapses and keeps a record every day of how well you are treated-this information is available to you and your care team.  BPAP is bilevel positive airway pressure that keeps your throat open and also assists each breath with a pressure boost to maintain  adequate breathing.  Special kinds of BPAP are used in patients who have inadequate breathing from lung or heart disease. In most cases, the device is  smart  and can slowly self-adjusts to assist breathing. Like CPAP, the device keeps a record of how well you are treated.  Your mask is your connection to the device. You get to choose what feels most comfortable and the staff will help to make sure if fits. Here: are some examples of the different masks that are available: Magnetic mask aids may assist with use but there are safety issues that should be addressed when considering with magnets* (see end of discussion).       Key points to remember on your journey with sleep apnea:  Sleep study.  PAP devices often need to be adjusted during a sleep study to show that they are effective and adjusted right.  Good tips to remember: Try wearing just the mask during a quiet time during the day so your body adapts to wearing it. A humidifier is recommended for comfort in most cases to prevent drying of your nose and throat. Allergy medication from your provider may help you if you are having nasal congestion.  Getting settled-in. It takes more than one night for most of us to get used to wearing a mask. Try wearing just the mask during a quiet time during the day so your body adapts to wearing it. A humidifier is recommended for comfort in most cases. Our team will work with you carefully on the first day and will be in contact within 4 days and again at 2 and 4 weeks for advice and remote device adjustments. Your therapy is evaluated by the device each day.   Use it every night. The more you are able to sleep naturally for 7-8 hours, the more likely you will have good sleep and to prevent health risks or symptoms from sleep apnea. Even if you use it 4 hours it helps. Occasionally all of us are unable to use a medical therapy, in sleep apnea, it is not dangerous to miss one night.   Communicate. Call our skilled team on the  number provided on the first day if your visit for problems that make it difficult to wear the device. Over 2 out of 3 patients can learn to wear the device long-term with help from our team. Remember to call our team or your sleep providers if you are unable to wear the device as we may have other solutions for those who cannot adapt to mask CPAP therapy. It is recommended that you sleep your sleep provider within the first 3 months and yearly after that if you are not having problems.   Use it for your health. We encourage use of CPAP masks during daytime quiet periods to allow your face and brain to adapt to the sensation of CPAP so that it will be a more natural sensation to awaken to at night or during naps. This can be very useful during the first few weeks or months of adapting to CPAP though it does not help medically to wear CPAP during wakefulness and  should not be used as a strategy just to meet guidelines.  Take care of your equipment. Make sure you clean your mask and tubing using directions every day and that your filter and mask are replaced as recommended or if they are not working.     *Masks with magnets:  Updated Contraindications  Masks with magnetic components are contraindicated for use by patients where they, or anyone in close physical contact while using the mask, have the following:   Active medical implants that interact with magnets (i.e., pacemakers, implantable cardioverter defibrillators (ICD), neurostimulators, cerebrospinal fluid (CSF) shunts, insulin/infusion pumps)   Metallic implants/objects containing ferromagnetic material (i.e., aneurysm clips/flow disruption devices, embolic coils, stents, valves, electrodes, implants to restore hearing or balance with implanted magnets, ocular implants, metallic splinters in the eye)  Updated Warning  Keep the mask magnets at a safe distance of at least 6 inches (150 mm) away from implants or medical devices that may be adversely affected by  magnetic interference. This warning applies to you or anyone in close physical contact with your mask. The magnets are in the frame and lower headgear clips, with a magnetic field strength of up to 400mT. When worn, they connect to secure the mask but may inadvertently detach while asleep.  Implants/medical devices, including those listed within contraindications, may be adversely affected if they change function under external magnetic fields or contain ferromagnetic materials that attract/repel to magnetic fields (some metallic implants, e.g., contact lenses with metal, dental implants, metallic cranial plates, screws, roberth hole covers, and bone substitute devices). Consult your physician and  of your implant / other medical device for information on the potential adverse effects of magnetic fields.    BESIDES CPAP, WHAT OTHER THERAPIES ARE THERE?    Positioning Device  Positioning devices are generally used when sleep apnea is mild and only occurs on your back.This example shows a pillow that straps around the waist. It may be appropriate for those whose sleep study shows milder sleep apnea that occurs primarily when lying flat on one's back. Preliminary studies have shown benefit but effectiveness at home may need to be verified by a home sleep test. These devices are generally not covered by medical insurance.  Examples of devices that maintain sleeping on the back to prevent snoring and mild sleep apnea.    Belt type body positioner  [email or web address]    Electronic reminder  http://nightshifttherapy.com/            Oral Appliance  What is oral appliance therapy?  An oral appliance device fits on your teeth at night like a retainer used after having braces. The device is made by a specialized dentist and requires several visits over 1-2 months before a manufactured device is made to fit your teeth and is adjusted to prevent your sleep apnea. Once an oral device is working properly, snoring should  be improved. A home sleep test may be recommended at that time if to determine whether the sleep apnea is adequately treated.       Some things to remember:  -Oral devices are often, but not always, covered by your medical insurance. Be sure to check with your insurance provider.   -If you are referred for oral therapy, you will be given a list of specialized dentists to consider or you may choose to visit the Web site of the American Academy of Dental Sleep Medicine  -Oral devices are less likely to work if you have severe sleep apnea or are extremely overweight.     More detailed information  An oral appliance is a small acrylic device that fits over the upper and lower teeth  (similar to a retainer or a mouth guard). This device slightly moves jaw forward, which moves the base of the tongue forward, opens the airway, improves breathing for effective treat snoring and obstructive sleep apnea in perhaps 7 out of 10 people .  The best working devices are custom-made by a dental device  after a mold is made of the teeth 1, 2, 3.  When is an oral appliance indicated?  Oral appliance therapy is recommended as a first-line treatment for patients with primary snoring, mild sleep apnea, and for patients with moderate sleep apnea who prefer appliance therapy to use of CPAP4, 5. Severity of sleep apnea is determined by sleep testing and is based on the number of respiratory events per hour of sleep.   How successful is oral appliance therapy?  The success rate of oral appliance therapy in patients with mild sleep apnea is 75-80% while in patients with moderate sleep apnea it is 50-70%. The chance of success in patients with severe sleep apnea is 40-50%. The research also shows that oral appliances have a beneficial effect on the cardiovascular health of KELVIN patients at the same magnitude as CPAP therapy7.  Oral appliances should be a second-line treatment in cases of severe sleep apnea, but if not completely  successful then a combination therapy utilizing CPAP plus oral appliance therapy may be effective. Oral appliances tend to be effective in a broad range of patients although studies show that the patients who have the highest success are females, younger patients, those with milder disease, and less severe obesity. 3, 6.   Finding a dentist that practices dental sleep medicine  Specific training is available through the American Academy of Dental Sleep Medicine for dentists interested in working in the field of sleep. To find a dentist who is educated in the field of sleep and the use of oral appliances, near you, visit the Web site of the American Academy of Dental Sleep Medicine.    References  1. Ni et al. Objectively measured vs self-reported compliance during oral appliance therapy for sleep-disordered breathing. Chest 2013; 144(5): 6281-8202.  2. Nhung et al. Objective measurement of compliance during oral appliance therapy for sleep-disordered breathing. Thorax 2013; 68(1): 91-96.  3. Briseyda, et al. Mandibular advancement devices in 620 men and women with KELVIN and snoring: tolerability and predictors of treatment success. Chest 2004; 125: 0637-2782.  4. Matthias, et al. Oral appliances for snoring and KELVIN: a review. Sleep 2006; 29: 244-262.  5. Geovanni et al. Oral appliance treatment for KELVIN: an update. J Clin Sleep Med 2014; 10(2): 215-227.  6. Regulo et al. Predictors of OSAH treatment outcome. J Dent Res 2007; 86: 3743-9793.      Weight Loss: Your Body mass index is 27.92 kg/m .    Being overweight does not necessarily mean you will have health consequences.  Those who have BMI over 35 or over 27 with existing medical conditions carries greater risk.   Weight loss decreases severity of sleep apnea in most people with obesity. For those with mild obesity who have developed snoring with weight gain, even 15-30 pound weight loss can improve and occasionally milder eliminate sleep apnea.   Structured and life-long dietary and health habits are necessary to lose weight and keep healthier weight levels.     The Comprehensive Weight loss program offers all aspects of weight loss strategies including two Non-Surgical Weight Loss Programs: Medical Weight Management and our 24 Week Healthy Lifestyle Program:    Medical Weight Management: You will meet with a Medical Weight Management Provider, as well as a Registered Dietician. The program may include medication therapy, dietary education, recommended exercise and physical therapy programs, monthly support group meetings, and possible psychological counseling. Follow up visits with the provider or dietician are scheduled based on your progress and needs.    24 Week Healthy Lifestyle Program: This unique program is designed to give you the support of weekly appointments and activities thru a 24-week period. It may include all of the components of the basic program (above), with the addition of 11 individual Health  Visits, 24-week access to the [employer] website for over 700 online classes, and monthly support group meetings. This program has an out-of-pocket expense of $499 to cover the items that can not be billed to insurance (health coaches and [employer] access), and is non-refundable/non-transferable (you may be able to use a Health Savings Account; ask your HSA provider). There may be an optional meal replacement plan prescribed as well.   Surgical management achieves meaningful long-term weight loss and improvement in health risks in most patients with more severe obesity.      Sleep Apnea Surgery:    Surgery for obstructive sleep apnea is considered generally only when other therapies fail to work. Surgery may be discussed with you if you are having a difficult time tolerating CPAP and or when there is an abnormal structure that requires surgical correction.  Nose and throat surgeries often enlarge the airway to prevent collapse.  Most of these  surgeries create pain for 1-2 weeks and up to half of the most common surgeries are not effective throughout life.  You should carefully discuss the benefits and drawbacks to surgery with your sleep provider and surgeon to determine if it is the best solution for you.   More information  Surgery for KELVIN is directed at areas that are responsible for narrowing or complete obstruction of the airway during sleep.  There are a wide range of procedures available to enlarge and/or stabilize the airway to prevent blockage of breathing in the three major areas where it can occur: the palate, tongue, and nasal regions.  Successful surgical treatment depends on the accurate identification of the factors responsible for obstructive sleep apnea in each person.  A personalized approach is required because there is no single treatment that works well for everyone.  Because of anatomic variation, consultation with an examination by a sleep surgeon is a critical first step in determining what surgical options are best for each patient.  In some cases, examination during sedation may be recommended in order to guide the selection of procedures.  Patients will be counseled about risks and benefits as well as the typical recovery course after surgery. Surgery is typically not a cure for a person s KELVIN.  However, surgery will often significantly improve one s KELVIN severity (termed  success rate ).  Even in the absence of a cure, surgery will decrease the cardiovascular risk associated with OSA7; improve overall quality of life8 (sleepiness, functionality, sleep quality, etc).    Palate Procedures:  Patients with KELVIN often have narrowing of their airway in the region of their tonsils and uvula.  The goals of palate procedures are to widen the airway in this region as well as to help the tissues resist collapse.  Modern palate procedure techniques focus on tissue conservation and soft tissue rearrangement, rather than tissue removal.   Often the uvula is preserved in this procedure. Residual sleep apnea is common in patient after pharyngoplasty with an average reduction in sleep apnea events of 33%2.      Tongue Procedures:  While patients are awake, the muscles that surround the throat are active and keep this region open for breathing. These muscles relax during sleep, allowing the tongue and other structures to collapse and block breathing.  There are several different tongue procedures available.  Selection of a tongue base procedure depends on characteristics seen on physical exam.  Generally, procedures are aimed at removing bulky tissues in this area or preventing the back of the tongue from falling back during sleep.  Success rates for tongue surgery range from 50-62%3.    Hypoglossal Nerve Stimulation:  Hypoglossal nerve stimulation has recently received approval from the [place] Food and Drug Administration for the treatment of obstructive sleep apnea.  This is based on research showing that the system was safe and effective in treating sleep apnea6.  Results showed that the median AHI score decreased 68%, from 29.3 to 9.0. This therapy uses an implant system that senses breathing patterns and delivers mild stimulation to airway muscles, which keeps the airway open during sleep.  The system consists of three fully implanted components: a small generator (similar in size to a pacemaker), a breathing sensor, and a stimulation lead.  Using a small handheld remote, a patient turns the therapy on before bed and off upon awakening.    Candidates for this device must be greater than 18 years of age, have moderate to severe obstructive sleep apnea with less than 25% central events  (AHI between 15-65), BMI less than 35, have tried CPAP/oral appliance for at least 8 weeks without success, and have appropriate upper airway anatomy (determined by a sleep endoscopy performed by Dr. Justin Abarca or Dr. Joseph Burns).    Nasal  Procedures:  Nasal obstruction can interfere with nasal breathing during the day and night.  Studies have shown that relief of nasal obstruction can improve the ability of some patients to tolerate positive airway pressure therapy for obstructive sleep apnea1.  Treatment options include medications such as nasal saline, topical corticosteroid and antihistamine sprays, and oral medications such as antihistamines or decongestants. Non-surgical treatments can include external nasal dilators for selected patients. If these are not successful by themselves, surgery can improve the nasal airway either alone or in combination with these other options.        Combination Procedures:  Combination of surgical procedures and other treatments may be recommended, particularly if patients have more than one area of narrowing or persistent positional disease.  The success rate of combination surgery ranges from 66-80%2,3.    References  Natasha MILES. The Role of the Nose in Snoring and Obstructive Sleep Apnoea: An Update.  Eur Arch Otorhinolaryngol. 2011; 268: 1365-73.   Maurizio SM; Brayden JA; Gerardo JR; Pallanch JF; Vicente MB; Vic SG; Stan APODACA. Surgical modifications of the upper airway for obstructive sleep apnea in adults: a systematic review and meta-analysis. SLEEP 2010;33(10):0833-0393. Bladimirrijurgen MALDONADO. Hypopharyngeal surgery in obstructive sleep apnea: an evidence-based medicine review.  Arch Otolaryngol Head Neck Surg. 2006 Feb;132(2):206-13.  Parminder YH1, Adamaris Y, Davian CAROL. The efficacy of anatomically based multilevel surgery for obstructive sleep apnea. Otolaryngol Head Neck Surg. 2003 Oct;129(4):327-35.  Yousif MALDONADO, Goldberg A. Hypopharyngeal Surgery in Obstructive Sleep Apnea: An Evidence-Based Medicine Review. Arch Otolaryngol Head Neck Surg. 2006 Feb;132(2):206-13.  Aurora CRUZ et al. Upper-Airway Stimulation for Obstructive Sleep Apnea.  N Engl J Med. 2014 Jan 9;370(2):139-49.  Bertin MAK et al. Increased Incidence of  Cardiovascular Disease in Middle-aged Men with Obstructive Sleep Apnea. Am J Respir Crit Care Med; 2002 166: 159-165  Wilian BURROWS et al. Studying Life Effects and Effectiveness of Palatopharyngoplasty (SLEEP) study: Subjective Outcomes of Isolated Uvulopalatopharyngoplasty. Otolaryngol Head Neck Surg. 2011; 144: 623-631.    WHAT IF I ONLY HAVE SNORING?  Mandibular advancement devices, lateral sleep positioning, long-term weight loss and treatment of nasal allergies have been shown to improve snoring.  Exercising tongue muscles with a game (https://[employer].Picfair/Scality/yanet/4Cable TV-reduce-snoring/sj9326572233) or stimulating the tongue during the day with a device (https://doi.org/10.3390/hgx70598616) have improved snoring in some individuals.  https://www.[employer].Life is [employer]/  https://www.sleepfoundation.org/best-anti-snoring-mouthpieces-and-mouthguards    Remember to Drive Safe... Drive Alive     Sleep health profoundly affects your health, mood, and your safety.  Thirty three percent of the population (one in three of us) is not getting enough sleep and many have a sleep disorder. Not getting enough sleep or having an untreated / undertreated sleep condition may make us sleepy without even knowing it. In fact, our driving could be dramatically impaired due to our sleep health. As your provider, here are some things I would like you to know about driving:     Here are some warning signs for impairment and dangerous drowsy driving:              -Having been awake more than 16 hours               -Looking tired               -Eyelid drooping              -Head nodding (it could be too late at this point)              -Driving for more than 30 minutes     Some things you could do to make the driving safer if you are experiencing some drowsiness:              -Stop driving and rest              -Call for transportation              -Make sure your sleep disorder is adequately treated     Some things that have been shown NOT to  work when experiencing drowsiness while driving:              -Turning on the radio              -Opening windows              -Eating any  distracting  /  entertaining  foods (e.g., sunflower seeds, candy, or any other)              -Talking on the phone      Your decision may not only impact your life, but also the life of others. Please, remember to drive safe for yourself and all of us.

## 2024-03-29 NOTE — PROGRESS NOTES
Outpatient Sleep Medicine Consultation:      Name: Tariq Perez MRN# [id number]   Age: 63 year old YOB: 1960     Date of Consultation: March 29, 2024  Consultation is requested by: Darren Tenorio MD  [address] [facility] [address]  [place] [address] Darren Tenorio  Primary care provider: Darren Tenorio       Reason for Sleep Consult:     Tariq Perez is sent by Darren Tenorio for a sleep consultation regarding possible dream enactment.    Patient s Reason for visit  Tariq Perez main reason for visit: possible RBD  Patient states problem(s) started: 2-3 years ago??  Tariqvanesa Perez's goals for this visit: sched overnight sleep study to see whats going on           Assessment and Plan:     Summary Sleep Diagnoses:  (G47.52) Dream enactment behavior (primary encounter diagnosis)   Comment: Tariq is a 63-year-old male who presents to the sleep clinic with concerns of possible REM sleep behavior disorder. The first time he recalls experiencing dream enactment was maybe 5-6 years ago. He has had three episodes of possible dream enactment in the month of March which is what prompted the referral to sleep medicine and today's visit. His wife witnessed him twice in the month of March kicking and thrashing his arms and legs in the night. He remembers kicking the bed frame about one month ago and this did correlate with dream content. His wife has noticed dream enactment a few times over the last few years as well. These behaviors seem like they occur more in the middle of the night. He denies anosmia; however, he does not think his sense of smell is as good as his wife's. He denies constipation. He has noticed a tremor intermittently over the past couple weeks, R hand>L. He thinks this is related to his increased dose of bupropion so he has since started to wean off this medication. Tariq first notes taking an antidepressant around the time of [id number]. He has been  on the desvenlafaxine for about one year and prior he was taking sertraline since about 2015. He was also taking mirtazapine at some point. He has been on eszopiclone since October 2022. Tariq denies falling out of bed, and he has never hit his wife.     Plan: Comprehensive Sleep Study  Recommended an in lab polysomnogram with 4-limb leads to assess for REM sleep without atonia as well as any underlying sleep disordered breathing. We reviewed RBD and medications associated with RBD. We discussed that it is possible dream enactment is secondary to underlying apnea or his medications. We briefly reviewed treatment options for RBD including melatonin and clonazepam, and we talked about ways to keep his sleeping environment safe. Tariq has already done a fair amount of research on RBD prior to this appointment. He is aware RBD commonly occurs in association with Parkinson disease, dementia with Lewy bodies, and multiple system atrophy.      (R06.83) Snoring (R35.1) Nocturia (G47.00) Insomnia, unspecified type   Comment: Tariq has been snoring for years. He has rare snort arousals while napping on the couch. His wife has never observed apnea. He does not feel completely rested when he wakes in the morning. He has never had trouble initiating sleep. He wakes 2-4 times per night, usually to use the bathroom. He does not nap as he knows this perpetuates his insomnia. If he watches TV in the afternoon or evening, he can unintentionally doze. His STOP-BANG is 4/8- snoring, sleepiness (ESS 10/24 consistent with a higher level of normal daytime sleepiness), age >50 (63), and male gender.      Plan: Comprehensive Sleep Study   Tariq is at intermediate risk for obstructive sleep apnea. We reviewed treatment options for obstructive sleep apnea including PAP therapy, mandibular advancement device, positional therapy, surgery, weight management, and hypoglossal nerve stimulator.     Comorbid Diagnoses:  Depression, insomnia    Summary  Recommendations:  Orders Placed This Encounter   Procedures    Comprehensive Sleep Study     Summary Counseling:    Sleep Testing Reviewed  Obstructive Sleep Apnea Reviewed  Complications of Untreated Sleep Apnea Reviewed    Patient will follow up approximately 2 weeks after his sleep study with Dr. Lundberg.   FABI Rivera CNP    Total time spent reviewing medical records, history and physical examination, review of previous testing and interpretation as well as documentation on this date: 70 minutes     CC: Darren Tenorio MD           History of Present Illness:     Tariq presents to the sleep clinic with concerns of possible REM sleep behavior disorder. The first time he recalls experiencing possible dream enactment was maybe 5-6 years ago. He has had three episodes of possible dream enactment in the month of March. His wife witnessed him twice in the month of March kicking and thrashing his arms and legs. He remembers kicking the bed frame about one month ago this did correlate with dream content. His wife has noticed possible dream enactment a few times over the last few years as well. These behaviors seem like they occur more in the middle of the night. He denies anosmia, but he does not think his sense of smell is as good as his wife's. He denies constipation. He has noticed a tremor intermittently over the past couple weeks R>L. He thinks this is related to his increased dose of bupropion. He is starting to wean off of his bupropion.             He does not feel completely rested when he wakes in the morning.     Tariq has been snoring for years. He has rare snort arousals while napping on the couch. His wife has never observed apnea.     He has been on the desvenlafaxine (Pristiq) for about one year and prior he was taking sertraline since about 2015. He first notes taking an antidepressant in 2000 or 2002.      He has been on eszopiclone since October 2022.     He goes to therapy.     Past  Sleep Evaluations: None     SLEEP-WAKE SCHEDULE:     Work/School Days: Patient goes to school/work: No   Usually gets into bed at 9:30-10 PM   Takes patient about 15-20 mins to fall asleep  Has trouble falling asleep 0 nights per week  Wakes up in the middle of the night 2-4 times.  Wakes up due to Use the bathroom x 2-4   He has trouble falling back asleep 2-3 times a week.   It usually takes 30-60 mins to get back to sleep. Usually he can fall back asleep.   Patient is usually up at 630am-7am He probably wakes up closer to 5:30 AM, but he tries not to look at the clock.   Uses alarm: No    Weekends/Non-work Days/All Other Days:  Usually gets into bed at same   Takes patient about same to fall asleep  Patient is usually up at same  Uses alarm: No    Sleep Need  Patient gets 6.5 sleep on average   Patient thinks he needs about 7 sleep    Tarqi Perez prefers to sleep in this position(s): Back;Side   Patient states they do the following activities in bed: Other He keeps his room dark and does not have a TV in his room.     Naps  Patient takes a purposeful nap   times a week and naps are usually none in duration  He feels better after a nap: Yes  He dozes off unintentionally watching tv 2-4 days per week. If he watches TV in the afternoon or evening, he can unintentionally doze.  Patient has had a driving accident or near-miss due to sleepiness/drowsiness: No    SLEEP DISRUPTIONS:    Breathing/Snoring  Patient snores: Yes  Other people complain about his snoring: Yes  Patient has been told he stops breathing in his sleep: No  He has issues with the following: Morning mouth dryness;Heartburn or reflux at night;Getting up to urinate more than once Denies morning headaches. Heartburn/reflux may prevent him from falling asleep if he eats too late in the night.     Movement:  Patient gets pain, discomfort, with an urge to move: No He denies restless legs.   It happens when he is resting: No  It happens more at night:  No  Patient has been told he kicks his legs at night: Yes     Behaviors in Sleep:  Tariq Perez has experienced the following behaviors while sleeping: Kicking or punching He has never fallen out of bed or left the bed. He has never hit his partner.   He has experienced sudden muscle weakness during the day: No  Pt denies bruxism, sleep talking, and sleep walking. Pt denies sleep paralysis, hypnagogue and cataplexy.    Is there anything else you would like your sleep provider to know: insomnia from depresion ands meds    CAFFEINE AND OTHER SUBSTANCES:    Patient consumes caffeinated beverages per day: 1-3  Last caffeine use is usually: 3pm  List of any prescribed or over the counter stimulants that patient takes: none?  List of any prescribed or over the counter sleep medication patient takes: lunesta 2mg  List of previous sleep medications that patient has tried: trazadone mirtazpene quetiapine   Patient drinks alcohol to help them sleep: No  Patient drinks alcohol near bedtime: No    Family History:  Patient has a family member been diagnosed with a sleep disorder: No      Social History: He lives at home with his wife.     SCALES:    EPWORTH SLEEPINESS SCALE         3/29/2024     1:01 PM    [place] Sleepiness Scale ( KEELEY Maldonado  [id number]<br>ESS - [place]/English - Final version - 21 Nov 07 - [facility] [place].)   Sitting and reading Slight chance of dozing   Watching TV High chance of dozing   Sitting, inactive in a public place (e.g. a theatre or a meeting) Slight chance of dozing   As a passenger in a car for an hour without a break Slight chance of dozing   Lying down to rest in the afternoon when circumstances permit Moderate chance of dozing   Sitting and talking to someone Would never doze   Sitting quietly after a lunch without alcohol Moderate chance of dozing   In a car, while stopped for a few minutes in traffic Would never doze   [place] Score (MC) 10   [place] Score (Sleep) 10         INSOMNIA  "SEVERITY INDEX (CHLOE)          3/29/2024    12:40 PM   Insomnia Severity Index (CHLOE)   Difficulty falling asleep 1   Difficulty staying asleep 2   Problems waking up too early 2   How SATISFIED/DISSATISFIED are you with your CURRENT sleep pattern? 3   How NOTICEABLE to others do you think your sleep problem is in terms of impairing the quality of your life? 2   How WORRIED/DISTRESSED are you about your current sleep problem? 3   To what extent do you consider your sleep problem to INTERFERE with your daily functioning (e.g. daytime fatigue, mood, ability to function at work/daily chores, concentration, memory, mood, etc.) CURRENTLY? 2   CHLOE Total Score 15       Guidelines for Scoring/Interpretation:  Total score categories:  0-7 = No clinically significant insomnia   8-14 = Subthreshold insomnia   15-21 = Clinical insomnia (moderate severity)  22-28 = Clinical insomnia (severe)  Used via courtesy of www.[employer].va.gov with permission from Danilo Crowell PhD., [facility]      STOP BANG         3/29/2024     2:00 PM   STOP BANG Questionnaire (  2008, the American Society of Anesthesiologists, Inc. [employer]   [facility]P [facility]: 123/85         GAD7        9/7/2021     5:17 PM   ERIKA-7    1. Feeling nervous, anxious, or on edge 0   2. Not being able to stop or control worrying 0   3. Worrying too much about different things 0   4. Trouble relaxing 0   5. Being so restless that it is hard to sit still 0   6. Becoming easily annoyed or irritable 0   7. Feeling afraid, as if something awful might happen 0   ERIKA-7 Total Score 0   If you checked any problems, how difficult have they made it for you to do your work, take care of things at home, or get along with other people? Not difficult at all         CAGE-AID         No data to display                CAGE-AID reprinted with permission from the [place] Medical Journal, CANDE Prescott. and ERWIN Caro, \"Conjoint screening questionnaires for alcohol " "and drug abuse\" [facility] Journal 94: 135-140, 1995.      PATIENT HEALTH QUESTIONNAIRE-9 (PHQ - 9)        10/15/2023    12:59 PM   PHQ-9 ([employer])   1.  Little interest or pleasure in doing things 3   2.  Feeling down, depressed, or hopeless 3   3.  Trouble falling or staying asleep, or sleeping too much 3   4.  Feeling tired or having little energy 3   5.  Poor appetite or overeating 3   6.  Feeling bad about yourself - or that you are a failure or have let yourself or your family down 1   7.  Trouble concentrating on things, such as reading the newspaper or watching television 1   8.  Moving or speaking so slowly that other people could have noticed. Or the opposite - being so fidgety or restless that you have been moving around a lot more than usual 0   9.  Thoughts that you would be better off dead, or of hurting yourself in some way 0   PHQ-9 Total Score 17   6.  Feeling bad about yourself 1   7.  Trouble concentrating 1   8.  Moving slowly or restless 0   9.  Suicidal or self-harm thoughts 0   1.  Little interest or pleasure in doing things Nearly every day   2.  Feeling down, depressed, or hopeless Nearly every day   3.  Trouble falling or staying asleep, or sleeping too much Nearly every day   4.  Feeling tired or having little energy Nearly every day   5.  Poor appetite or overeating Nearly every day   6.  Feeling bad about yourself Several days   7.  Trouble concentrating Several days   8.  Moving slowly or restless Not at all   9.  Suicidal or self-harm thoughts Not at all   PHQ-9 via [employer] TOTAL SCORE-----> 17 (Moderately severe depression)   Difficulty at work, home, or with people Very difficult       Developed by Cyrus Sifuentes, Florence Bagley, Rafa Maurice and colleagues, with an educational damián from [employer] No permission required to reproduce, translate, display or distribute.        Allergies:    No Known Allergies    Medications:    Current Outpatient Medications "   Medication Sig Dispense Refill    buPROPion (WELLBUTRIN XL) 150 MG 24 hr tablet       desvenlafaxine (PRISTIQ) 50 MG 24 hr tablet       eszopiclone (LUNESTA) 3 MG tablet       tadalafil (CIALIS) 5 MG tablet Take 1 tablet (5 mg) by mouth daily Never use with nitroglycerin, terazosin or doxazosin. 90 tablet 3    Vitamin D, Cholecalciferol, 25 MCG (1000 UT) CAPS          Problem List:  Patient Active Problem List    Diagnosis Date Noted    Mild recurrent major depression (H24) 12/08/2022     Priority: Medium    GERD (gastroesophageal reflux disease) 02/20/2012     Priority: Medium    CARDIOVASCULAR SCREENING; LDL GOAL LESS THAN 160 10/31/2010     Priority: Medium        Past Medical/Surgical History:  Past Medical History:   Diagnosis Date    Depression     Recurrent major depressive disorder, in full remission (H24) 9/16/2016     Past Surgical History:   Procedure Laterality Date    COLONOSCOPY  3/3/2012    Procedure:COLONOSCOPY; COLONOSCOPY; Surgeon:AL DIXON; Location: GI    COLONOSCOPY N/A 2/28/2023    Procedure: COLONOSCOPY;  Surgeon: Nancy Alex MD;  Location: [facility] OR    DECOMPRESSION LUMBAR ONE LEVEL      TONSILLECTOMY         Social History:  Social History     Socioeconomic History    Marital status:      Spouse name: Mariel    Number of children: 2    Years of education: 14    Highest education level: Not on file   Occupational History    Occupation: computer programer     Comment: [email or web address]   Tobacco Use    Smoking status: Never    Smokeless tobacco: Never   Vaping Use    Vaping Use: Never used   Substance and Sexual Activity    Alcohol use: Yes     Comment: Very seldom    Drug use: No    Sexual activity: Yes     Partners: Female   Other Topics Concern    Parent/sibling w/ CABG, MI or angioplasty before 65F 55M? Not Asked     Service Not Asked    Blood Transfusions Not Asked    Caffeine Concern No    Occupational Exposure No    Hobby Hazards No    Sleep Concern No    Stress Concern  No     Comment: h/o depression    Weight Concern No    Special Diet No     Comment: low salt    Back Care No    Exercise Yes     Comment: daily exercise , running, biking    Bike Helmet Yes    Seat Belt Yes    Self-Exams Not Asked   Social History Narrative    Not on file     Social Determinants of Health     Financial Resource Strain: Low Risk  (10/15/2023)    Financial Resource Strain     Within the past 12 months, have you or your family members you live with been unable to get utilities (heat, electricity) when it was really needed?: No   Food Insecurity: Low Risk  (10/15/2023)    Food Insecurity     Within the past 12 months, did you worry that your food would run out before you got money to buy more?: No     Within the past 12 months, did the food you bought just not last and you didn t have money to get more?: No   Transportation Needs: Low Risk  (10/15/2023)    Transportation Needs     Within the past 12 months, has lack of transportation kept you from medical appointments, getting your medicines, non-medical meetings or appointments, work, or from getting things that you need?: No   Physical Activity: Not on file   Stress: Not on file   Social Connections: Not on file   Interpersonal Safety: Low Risk  (10/16/2023)    Interpersonal Safety     Do you feel physically and emotionally safe where you currently live?: Yes     Within the past 12 months, have you been hit, slapped, kicked or otherwise physically hurt by someone?: No     Within the past 12 months, have you been humiliated or emotionally abused in other ways by your partner or ex-partner?: No   Housing Stability: Low Risk  (10/15/2023)    Housing Stability     Do you have housing? : Yes     Are you worried about losing your housing?: No       Family History:  Family History   Problem Relation Age of Onset    Gastrointestinal Disease Mother         b:1931 gastric reflux    Depression Father         d:age 76 manic depression    Depression Sister          "b:1964       Review of Systems:  A complete review of systems reviewed by me is negative with the exeption of what has been mentioned in the history of present illness.  In the last TWO WEEKS have you experienced any of the following symptoms?    Fevers: No   Night Sweats: No   Weight Gain: No   Pain at Night: No   Double Vision: No   Changes in Vision: No   Difficulty Breathing through Nose: No   In the last TWO WEEKS have you experienced any of the following symptoms?    Sore Throat in Morning: No   Dry Mouth in the Morning: Yes   Shortness of Breath Lying Flat: No   Shortness of Breath With Activity: No   Awakening with Shortness of Breath: No   Increased Cough: No   In the last TWO WEEKS have you experienced any of the following symptoms?    Heart Racing at Night: No   Swelling in Feet or Legs: No   Diarrhea at Night: No   Heartburn at Night: No   Urinating More than Once at Night: Yes   In the last TWO WEEKS have you experienced any of the following symptoms?    Losing Control of Urine at Night: No   Joint Pains at Night: No   Headaches in Morning: No   Weakness in Arms or Legs: No   Depressed Mood: Yes   Anxiety: Yes     Physical Examination:  Vitals: /85   Pulse 92   Resp 16   Ht 1.816 m (5' 11.5\")   Wt 92.1 kg (203 lb)   SpO2 99%   BMI 27.92 kg/m    BMI= Body mass index is 27.92 kg/m .    GENERAL APPEARANCE: healthy, alert, no distress, and cooperative  EYES: Eyes grossly normal to inspection and conjunctivae and sclerae normal  HENT: oropharynx crowded and oral mucous membranes moist  NECK: no asymmetry, masses, or scars  RESP: no increased work of breathing noted, no audible cough or wheeze   NEURO: mentation intact, speech normal, and tremor noted to hands R>L  PSYCH: mentation appears normal and anxious  Mallampati Class: III.  Tonsillar Stage: 0 surgically removed.         Data: All pertinent previous laboratory data reviewed     Recent Labs   Lab Test 10/16/23  1409 12/08/22  0948    " "139   POTASSIUM 4.4 4.6   CHLORIDE 98 101   CO2 30* 25   ANIONGAP 10 13   GLC 96 103*   BUN 20.9 28.1*   CR 1.05 1.04   YARI 9.6 9.2       Recent Labs   Lab Test 12/08/22  0948   WBC 6.7   RBC 5.20   HGB 16.2   HCT 47.7   MCV 92   MCH 31.2   MCHC 34.0   RDW 12.1          Recent Labs   Lab Test 10/07/20  0727   PROTTOTAL 7.1   ALBUMIN 3.5   BILITOTAL 0.5   ALKPHOS 64   AST 20   ALT 31       TSH (mU/L)   Date Value   10/07/2020 1.20   10/21/2019 1.40       No results found for: \"UAMP\", \"UBARB\", \"BENZODIAZEUR\", \"UCANN\", \"UCOC\", \"OPIT\", \"UPCP\"    No results found for: \"IRONSAT\", \"[id number]\", \"REJI\"    No results found for: \"PH\", \"PHARTERIAL\", \"PO2\", \"[id number]\", \"SAT\", \"PCO2\", \"HCO3\", \"BASEEXCESS\", \"ARTURO\", \"BEB\"    @LABRCNTIPR(phv:4,pco2v:4,po2v:4,hco3v:4,destinee:4,o2per:4)@    Echocardiology: No results found for this or any previous visit (from the past 4320 hour(s)).    Chest x-ray: No results found for this or any previous visit from the past 365 days.      Chest CT: No results found for this or any previous visit from the past 365 days.      PFT: Most Recent [facility] Pulmonary Function Testing    Kelly Alanis, FABI CNP 3/29/2024   "

## 2024-04-09 ENCOUNTER — MYC MEDICAL ADVICE (OUTPATIENT)
Dept: SLEEP MEDICINE | Facility: CLINIC | Age: 64
End: 2024-04-09

## 2024-04-10 ASSESSMENT — SLEEP AND FATIGUE QUESTIONNAIRES
HOW LIKELY ARE YOU TO NOD OFF OR FALL ASLEEP WHEN YOU ARE A PASSENGER IN A CAR FOR AN HOUR WITHOUT A BREAK: MODERATE CHANCE OF DOZING
HOW LIKELY ARE YOU TO NOD OFF OR FALL ASLEEP WHILE LYING DOWN TO REST IN THE AFTERNOON WHEN CIRCUMSTANCES PERMIT: SLIGHT CHANCE OF DOZING
HOW LIKELY ARE YOU TO NOD OFF OR FALL ASLEEP WHILE WATCHING TV: MODERATE CHANCE OF DOZING
HOW LIKELY ARE YOU TO NOD OFF OR FALL ASLEEP WHILE SITTING AND TALKING TO SOMEONE: WOULD NEVER DOZE
HOW LIKELY ARE YOU TO NOD OFF OR FALL ASLEEP WHILE SITTING AND READING: SLIGHT CHANCE OF DOZING
HOW LIKELY ARE YOU TO NOD OFF OR FALL ASLEEP IN A CAR, WHILE STOPPED FOR A FEW MINUTES IN TRAFFIC: WOULD NEVER DOZE
HOW LIKELY ARE YOU TO NOD OFF OR FALL ASLEEP WHILE SITTING INACTIVE IN A PUBLIC PLACE: MODERATE CHANCE OF DOZING
HOW LIKELY ARE YOU TO NOD OFF OR FALL ASLEEP WHILE SITTING QUIETLY AFTER LUNCH WITHOUT ALCOHOL: MODERATE CHANCE OF DOZING

## 2024-04-17 ENCOUNTER — THERAPY VISIT (OUTPATIENT)
Dept: SLEEP MEDICINE | Facility: CLINIC | Age: 64
End: 2024-04-17
Payer: COMMERCIAL

## 2024-04-17 DIAGNOSIS — G47.52 DREAM ENACTMENT BEHAVIOR: ICD-10-CM

## 2024-04-17 DIAGNOSIS — R35.1 NOCTURIA: ICD-10-CM

## 2024-04-17 DIAGNOSIS — G47.00 INSOMNIA, UNSPECIFIED TYPE: ICD-10-CM

## 2024-04-17 DIAGNOSIS — R06.83 SNORING: ICD-10-CM

## 2024-04-17 PROCEDURE — 95810 POLYSOM 6/> YRS 4/> PARAM: CPT | Performed by: INTERNAL MEDICINE

## 2024-04-26 NOTE — PROCEDURES
" SLEEP STUDY INTERPRETATION  DIAGNOSTIC POLYSOMNOGRAPHY REPORT      Patient: MEGAN NATHAN  YOB: 1960  Study Date: 4/17/2024  MRN: [id number]  Referring Provider: MD Tenorio Sandeep  Ordering Provider: FABI Alanis Colie    Indications for Polysomnography: The patient is a 63 year old Male who is 5' 11\" and weighs 203.0 lbs. His BMI is 28.4, [place] sleepiness scale 10. Relevant medical history includes snoring, dream enactment. A diagnostic polysomnogram was performed to evaluate for sleep apnea/RBD.    Polysomnogram Data: A full night polysomnogram recorded the standard physiologic parameters including EEG, EOG, EMG, ECG, nasal and oral airflow. Respiratory parameters of chest and abdominal movements were recorded with respiratory inductance plethysmography. Oxygen saturation was recorded by pulse oximetry. Hypopnea scoring rule used: 1B 4%.    Sleep Architecture: Sleep fragmentation  The total recording time of the polysomnogram was 526.0 minutes. The total sleep time was 453.0 minutes. Sleep latency was 5.5 minutes. REM latency was 129.0 minutes. Arousal index was 39.6 arousals per hour. Sleep efficiency was 86.1%. Wake after sleep onset was 67.5 minutes. The patient spent 6.0% of total sleep time in Stage N1, 65.8% in Stage N2, 10.6% in Stage N3, and 17.7% in REM. Time in REM supine was 41.0 minutes.    Respiration: Mild KELVIN    Events ? The polysomnogram revealed a presence of 6 obstructive, 7 central, and - mixed apneas resulting in an apnea index of 1.7 events per hour. There were 49 obstructive hypopneas and - central hypopneas resulting in an obstructive hypopnea index of 6.5 and central hypopnea index of - events per hour. The combined apnea/hypopnea index was 8.2 events per hour (central apnea/hypopnea index was 0.9 events per hour). The REM AHI was 7.5 events per hour. The supine AHI was 14.8 events per hour. The RERA index was 6.5 events per hour.  The RDI was 14.7 events per " hour.    Snoring - was reported as moderate-loud.    Respiratory rate and pattern - was notable for normal respiratory rate and pattern.    Sustained Sleep Associated Hypoventilation - Transcutaneous carbon dioxide monitoring was not used.    Sleep Associated Hypoxemia - (Greater than 5 minutes O2 sat at or below 88%) was not present. Baseline oxygen saturation was 93.5%. Lowest oxygen saturation was 84.0%. Time spent less than or equal to 88% was 0.7 minutes. Time spent less than or equal to 89% was 3.3 minutes.    Movement Activity: The patient had elevated motor activity during both NREM (PLMs) and REM (REM sleep without atonia) sleep.      Periodic Limb Activity - There were 207 PLMs during the entire study. The PLM index was 27.4 movements per hour. The PLM Arousal Index was 10.2 per hour.    REM EMG Activity - Excessive transient muscle activity was present (mild).    Nocturnal Behavior - Abnormal sleep related behaviors were not clearly demonstrated.    Bruxism - None apparent.    Cardiac Summary: Sinus, intermittent tachycardia  The average pulse rate was 84.1 bpm. The minimum pulse rate was 71.0 bpm while the maximum pulse rate was 101.0 bpm.      Assessment:     Mild KELVIN     The patient had elevated motor activity during both NREM (PLMs) and REM (REM sleep without atonia) sleep.      Recommendations:    Mild KELVIN can be treated with the following options:  o Dental Appliance  o Auto CPAP  o Upper Airway Surgery  o Position restriction device to prevent the patient from sleeping supine    Advice regarding the risks of drowsy driving.    Suggest optimizing sleep schedule and avoiding sleep deprivation.    Treatment of PLMs (dopaminergic agents or delta-2 ligands) should be targeted at patients who either have wakeful motor restlessness or those in whom there is a high clinical suspicion of periodic limb movement disorder and not for elevated PLMs alone.    Once sleep disordered breathing is addressed  recommend screening for dream enactment and if present consider a diagnosis of RBD.     Diagnostic Codes: G47.33, G47.9       Niranjan Lundberg MD 4-  Diplomate, Sleep Medicine  American Board of Psychiatry and Neurology

## 2024-04-29 ENCOUNTER — MYC MEDICAL ADVICE (OUTPATIENT)
Dept: SLEEP MEDICINE | Facility: CLINIC | Age: 64
End: 2024-04-29
Payer: COMMERCIAL

## 2024-04-29 LAB — SLPCOMP: NORMAL

## 2024-04-29 ASSESSMENT — SLEEP AND FATIGUE QUESTIONNAIRES
HOW LIKELY ARE YOU TO NOD OFF OR FALL ASLEEP WHILE LYING DOWN TO REST IN THE AFTERNOON WHEN CIRCUMSTANCES PERMIT: SLIGHT CHANCE OF DOZING
HOW LIKELY ARE YOU TO NOD OFF OR FALL ASLEEP WHEN YOU ARE A PASSENGER IN A CAR FOR AN HOUR WITHOUT A BREAK: SLIGHT CHANCE OF DOZING
HOW LIKELY ARE YOU TO NOD OFF OR FALL ASLEEP WHILE SITTING AND READING: MODERATE CHANCE OF DOZING
HOW LIKELY ARE YOU TO NOD OFF OR FALL ASLEEP IN A CAR, WHILE STOPPED FOR A FEW MINUTES IN TRAFFIC: WOULD NEVER DOZE
HOW LIKELY ARE YOU TO NOD OFF OR FALL ASLEEP WHILE SITTING QUIETLY AFTER LUNCH WITHOUT ALCOHOL: SLIGHT CHANCE OF DOZING
HOW LIKELY ARE YOU TO NOD OFF OR FALL ASLEEP WHILE SITTING AND TALKING TO SOMEONE: WOULD NEVER DOZE
HOW LIKELY ARE YOU TO NOD OFF OR FALL ASLEEP WHILE WATCHING TV: HIGH CHANCE OF DOZING
HOW LIKELY ARE YOU TO NOD OFF OR FALL ASLEEP WHILE SITTING INACTIVE IN A PUBLIC PLACE: MODERATE CHANCE OF DOZING

## 2024-05-06 ENCOUNTER — OFFICE VISIT (OUTPATIENT)
Dept: SLEEP MEDICINE | Facility: CLINIC | Age: 64
End: 2024-05-06
Payer: COMMERCIAL

## 2024-05-06 VITALS
SYSTOLIC BLOOD PRESSURE: 132 MMHG | BODY MASS INDEX: 28.17 KG/M2 | OXYGEN SATURATION: 96 % | WEIGHT: 201.2 LBS | HEIGHT: 71 IN | DIASTOLIC BLOOD PRESSURE: 85 MMHG | HEART RATE: 89 BPM

## 2024-05-06 DIAGNOSIS — G47.52 RBD (REM BEHAVIORAL DISORDER): Primary | ICD-10-CM

## 2024-05-06 DIAGNOSIS — F51.04 PSYCHOPHYSIOLOGICAL INSOMNIA: ICD-10-CM

## 2024-05-06 PROCEDURE — 99215 OFFICE O/P EST HI 40 MIN: CPT | Mod: GC | Performed by: PSYCHIATRY & NEUROLOGY

## 2024-05-06 PROCEDURE — 99417 PROLNG OP E/M EACH 15 MIN: CPT | Mod: GC | Performed by: PSYCHIATRY & NEUROLOGY

## 2024-05-06 RX ORDER — ESZOPICLONE 2 MG/1
2 TABLET, FILM COATED ORAL AT BEDTIME
COMMUNITY
Start: 2024-01-01 | End: 2024-10-04 | Stop reason: DRUGHIGH

## 2024-05-06 NOTE — PROGRESS NOTES
Outpatient Sleep Medicine Consultation:      Name: Tariq Perez MRN# [id number]   Age: 63 year old YOB: 1960     Date of Consultation: May 6, 2024  Consultation is requested by: No referring provider defined for this encounter. No ref. provider found  Primary care provider: Sourav Herr       Reason for Sleep Consult:     Tariq Perez is sent by No ref. provider found for a sleep consultation regarding REM behavior disorder.    Patient s Reason for visit  Tariq Perez main reason for visit: sleep quality and symptoms if Rem Behavior Disorder  Patient states problem(s) started: 5-6 years ago ..estimated  Tariq Perez's goals for this visit: to learn how to get better sleep Aand how to address possible RBD           Assessment and Plan:       Tariq Perez is a 63-year-old male with history of recurrent depression, mild obstructive sleep apnea aggravated in supine sleep, chronic insomnia and REM behavior disorder who is seen today for further evaluation and management of RBD.    Summary Sleep Diagnoses:  REM behavior disorder (5-HT RBD from sertraline)  Insomnia- maintenance and terminal  Mild obstructive sleep apnea : Aggravated in supine sleep     Comorbid Diagnoses:  Depression     Summary Recommendations:  -We discussed in detail regarding different treatment options for mild obstructive sleep apnea including mandibular advancement device and CPAP.  Patient prefers treatment of KELVIN with positional therapy, sleeping in lateral position and avoiding supine sleep. If noted to have snoring despite lateral sleep or having difficulty avoiding supine sleep, recommend that patient reach out via myChart. Will plan to initiate PAP therapy for sleep disordered breathing   - Referred to [facility]  for insomnia   - Discussed to start melatonin 3 mg nightly for dream enactment behavior.   -  Discussed environmental safety and keeping the sleep environment safer  - Patient was strongly advised to  avoid driving while drowsy or sleepy.   -Discussed the 5-HT RBD study he will think about and get back to us if he is interested.    Follow up in 4 months with Dr. Lundberg     Orders Placed This Encounter   Procedures    Sleep Psychology  Referral     Patient was seen and discussed with Dr. Toño Purvis MD   Sleep Medicine Fellow    Outpatient Sleep Medicine Staff  I have seen and evaluated the patient and discussed with the sleep fellow on 5-6-24.  I supervised the visit and agree with the documentation.      In addition to face to face time I have also reviewed the patients chart, coordinated care, assisted in placing orders and documentation.  Total time (Face-to-Face, care coordination, orders, documentation) spent on 5-6-24 was greater than 60 minutes.     Niranjan Lundberg MD           History of Present Illness:     Tariq Perez is a 63-year-old man with history of recurrent major depression, GERD, BPH, ED, mild obstructive sleep apnea, chronic insomnia and REM behavior disorder (confirmed via recent PSG on 4/17/2024) who is seen in clinic today for further evaluation and management of RBD.    Patient reports history of dream enactment behavior for at least 5 to 6 years, primarily after starting Zoloft for history of depression.  Patient has previously also been on Effexor, and is currently on desvenlafaxine for depression.  Patient states that him and his wife noted dream enactment behavior multiple times in March which caused him to pursue sleep consultation for further evaluation.  He underwent overnight polysomnography testing on April 17, 2024 which showed REM sleep without atonia.  He is seen today for follow-up after the sleep study for further discussion and management.  Patient reports having 3-4 episodes of dream enactment behavior in March 2024, but has not been noted to have as much acting out of dreams since.  He thinks this may be because his wife might be getting deeper sleep  and is not waking up to observe his movements as much.  Patient denies any falls out of bed.  No history dream enactment behavior leading to injury to bed partner, but did have 1 episode of kicking his leg in his sleep leading to hurting his leg.  Patient specifically recalls trying to kick something or get away from something in his dream.    Neurodegenerative Symptoms screen:  No constipation  No Anosmia   No gait or balance issues  No orthostasis  + tremor (postural)  + Very mild short term memory changes in the past 2 years   No hallucinations    Past Sleep Evaluations:  4/17/2024  Diagnostic polysomnography  AHI: 8.2/h, supine 14.7.  SpO2 gato 84%  PLM index 27.4, PLM arousal index: 10.2  Mild amount of excessive transient muscle activity noted during REM sleep      SLEEP-WAKE SCHEDULE:     Work/School Days: Patient goes to school/work: No   Usually gets into bed at 930pm -10pm  Takes patient about 20 30 m8ns to fall asleep  Has trouble falling asleep 1 or 2 per month nights per week  Wakes up in the middle of the night 2 - 4 times.  Wakes up due to Use the bathroom;Uncertain  He has trouble falling back asleep 3-4 times a week.   It usually takes 20 40 mins guesstimate to get back to sleep  Patient is usually up at 630am - 7am  Uses alarm: No    Weekends/Non-work Days/All Other Days:  Usually gets into bed at same as weekdays   Takes patient about same as weekdays to fall asleep  Patient is usually up at same as weekdays  Uses alarm: No    Sleep Need  Patient gets  5-7  hours sleep on average   Patient thinks he needs about 7 -8 sleep    Tariq Perez prefers to sleep in this position(s): Side   Patient states they do the following activities in bed:      Naps  Patient takes a purposeful nap 0 times a week and naps are usually 0 in duration  He feels better after a nap: No  He dozes off unintentionally 7 days per week  Patient has had a driving accident or near-miss due to sleepiness/drowsiness:  No      SLEEP DISRUPTIONS:    Breathing/Snoring  Patient snores:Yes  Other people complain about his snoring: Yes  Patient has been told he stops breathing in his sleep:No  He has issues with the following: Morning mouth dryness;Getting up to urinate more than once    Movement:  Patient gets pain, discomfort, with an urge to move:  No  It happens when he is resting:  No  It happens more at night:  No  Patient has been told he kicks his legs at night:  Yes     Behaviors in Sleep:  Tariq Perez has experienced the following behaviors while sleeping: Kicking or punching  He has experienced sudden muscle weakness during the day: No      Is there anything else you would like your sleep provider to know: Just feel sleep is a problem for me      CAFFEINE AND OTHER SUBSTANCES:    Patient consumes caffeinated beverages per day:  2 -3 hot black tea or uced tea  Last caffeine use is usually: usually 3pm  List of any prescribed or over the counter stimulants that patient takes: none  List of any prescribed or over the counter sleep medication patient takes: generic lunesta 2 mg  List of previous sleep medications that patient has tried: trazadon, mirtazapine, seroqel, ambien  Patient drinks alcohol to help them sleep: No  Patient drinks alcohol near bedtime: No    Family History:  Patient has a family member been diagnosed with a sleep disorder: No   Mother had history of dementia       SCALES:    EPWORTH SLEEPINESS SCALE       4/29/2024     1:38 PM    [place] Sleepiness Scale ( KEELEY Maldonado  [id number]<br>ESS - [place]/English - Final version - 21 Nov 07 - [facility] [place].)   Sitting and reading Moderate chance of dozing   Watching TV High chance of dozing   Sitting, inactive in a public place (e.g. a theatre or a meeting) Moderate chance of dozing   As a passenger in a car for an hour without a break Slight chance of dozing   Lying down to rest in the afternoon when circumstances permit Slight chance of dozing   Sitting  and talking to someone Would never doze   Sitting quietly after a lunch without alcohol Slight chance of dozing   In a car, while stopped for a few minutes in traffic Would never doze   [place] Score (MC) 10   [place] Score (Sleep) 10       INSOMNIA SEVERITY INDEX (CHLOE)        3/29/2024    12:40 PM   Insomnia Severity Index (CHLOE)   Difficulty falling asleep 1   Difficulty staying asleep 2   Problems waking up too early 2   How SATISFIED/DISSATISFIED are you with your CURRENT sleep pattern? 3   How NOTICEABLE to others do you think your sleep problem is in terms of impairing the quality of your life? 2   How WORRIED/DISTRESSED are you about your current sleep problem? 3   To what extent do you consider your sleep problem to INTERFERE with your daily functioning (e.g. daytime fatigue, mood, ability to function at work/daily chores, concentration, memory, mood, etc.) CURRENTLY? 2   CHLOE Total Score 15   Guidelines for Scoring/Interpretation:  Total score categories:  0-7 = No clinically significant insomnia   8-14 = Subthreshold insomnia   15-21 = Clinical insomnia (moderate severity)  22-28 = Clinical insomnia (severe)  Used via courtesy of www.rPathth.va.gov with permission from Danilo Crowell PhD., [facility]      STOP BANG         5/6/2024     2:00 PM   STOP BANG Questionnaire (  2008, the American Society of Anesthesiologists, Inc. [employer]   B/P Clinic: 132/85   BMI Clinic: 27.67         GAD7      9/7/2021     5:17 PM   ERIKA-7    1. Feeling nervous, anxious, or on edge 0   2. Not being able to stop or control worrying 0   3. Worrying too much about different things 0   4. Trouble relaxing 0   5. Being so restless that it is hard to sit still 0   6. Becoming easily annoyed or irritable 0   7. Feeling afraid, as if something awful might happen 0   ERIKA-7 Total Score 0   If you checked any problems, how difficult have they made it for you to do your work, take care of things at  "home, or get along with other people? Not difficult at all         CAGE-AID         No data to display                CAGE-AID reprinted with permission from the [place] Medical Journal, CANDE Prescott. and ERWIN Caro, \"Conjoint screening questionnaires for alcohol and drug abuse\" [place] Medical Journal 94: 135-140, 1995.      PATIENT HEALTH QUESTIONNAIRE-9 (PHQ - 9)        10/15/2023    12:59 PM   PHQ-9 ([employer])   1.  Little interest or pleasure in doing things 3   2.  Feeling down, depressed, or hopeless 3   3.  Trouble falling or staying asleep, or sleeping too much 3   4.  Feeling tired or having little energy 3   5.  Poor appetite or overeating 3   6.  Feeling bad about yourself - or that you are a failure or have let yourself or your family down 1   7.  Trouble concentrating on things, such as reading the newspaper or watching television 1   8.  Moving or speaking so slowly that other people could have noticed. Or the opposite - being so fidgety or restless that you have been moving around a lot more than usual 0   9.  Thoughts that you would be better off dead, or of hurting yourself in some way 0   PHQ-9 Total Score 17   6.  Feeling bad about yourself 1   7.  Trouble concentrating 1   8.  Moving slowly or restless 0   9.  Suicidal or self-harm thoughts 0   1.  Little interest or pleasure in doing things Nearly every day   2.  Feeling down, depressed, or hopeless Nearly every day   3.  Trouble falling or staying asleep, or sleeping too much Nearly every day   4.  Feeling tired or having little energy Nearly every day   5.  Poor appetite or overeating Nearly every day   6.  Feeling bad about yourself Several days   7.  Trouble concentrating Several days   8.  Moving slowly or restless Not at all   9.  Suicidal or self-harm thoughts Not at all   PHQ-9 via [facility] TOTAL SCORE-----> 17 (Moderately severe depression)   Difficulty at work, home, or with people Very difficult   Developed by Cyrus Sifuentes, " Florence Bagley, Rafa Maurice and colleagues, with an educational damián from [employer] No permission required to reproduce, translate, display or distribute.        Allergies:    No Known Allergies    Medications:    Current Outpatient Medications   Medication Sig Dispense Refill    eszopiclone (LUNESTA) 2 MG tablet Take 2 mg by mouth at bedtime      Magnesium Oxide 140 MG CAPS Take 140 mg by mouth at bedtime      desvenlafaxine (PRISTIQ) 50 MG 24 hr tablet       tadalafil (CIALIS) 5 MG tablet Take 1 tablet (5 mg) by mouth daily Never use with nitroglycerin, terazosin or doxazosin. 90 tablet 3    Vitamin D, Cholecalciferol, 25 MCG (1000 UT) CAPS          Problem List:  Patient Active Problem List    Diagnosis Date Noted    Mild recurrent major depression (H24) 12/08/2022     Priority: Medium    GERD (gastroesophageal reflux disease) 02/20/2012     Priority: Medium    CARDIOVASCULAR SCREENING; LDL GOAL LESS THAN 160 10/31/2010     Priority: Medium        Past Medical/Surgical History:  Past Medical History:   Diagnosis Date    Depression     Recurrent major depressive disorder, in full remission (H24) 9/16/2016     Past Surgical History:   Procedure Laterality Date    COLONOSCOPY  3/3/2012    Procedure:COLONOSCOPY; COLONOSCOPY; Surgeon:AL DIXON; Location: GI    COLONOSCOPY N/A 2/28/2023    Procedure: COLONOSCOPY;  Surgeon: Nancy Alex MD;  Location: [facility] OR    DECOMPRESSION LUMBAR ONE LEVEL      TONSILLECTOMY         Social History:  Social History     Socioeconomic History    Marital status:      Spouse name: Mariel    Number of children: 2    Years of education: 14    Highest education level: Not on file   Occupational History    Occupation: computer programer     Comment: [employer]   Tobacco Use    Smoking status: Never    Smokeless tobacco: Never   Vaping Use    Vaping status: Never Used   Substance and Sexual Activity    Alcohol use: Yes     Comment: Very seldom    Drug use: No    Sexual  activity: Yes     Partners: Female   Other Topics Concern    Parent/sibling w/ CABG, MI or angioplasty before 65F 55M? Not Asked     Service Not Asked    Blood Transfusions Not Asked    Caffeine Concern No    Occupational Exposure No    Hobby Hazards No    Sleep Concern No    Stress Concern No     Comment: h/o depression    Weight Concern No    Special Diet No     Comment: low salt    Back Care No    Exercise Yes     Comment: daily exercise , running, biking    Bike Helmet Yes    Seat Belt Yes    Self-Exams Not Asked   Social History Narrative    Not on file     Social Determinants of Health     Financial Resource Strain: Low Risk  (10/15/2023)    Financial Resource Strain     Within the past 12 months, have you or your family members you live with been unable to get utilities (heat, electricity) when it was really needed?: No   Food Insecurity: Low Risk  (10/15/2023)    Food Insecurity     Within the past 12 months, did you worry that your food would run out before you got money to buy more?: No     Within the past 12 months, did the food you bought just not last and you didn t have money to get more?: No   Transportation Needs: Low Risk  (10/15/2023)    Transportation Needs     Within the past 12 months, has lack of transportation kept you from medical appointments, getting your medicines, non-medical meetings or appointments, work, or from getting things that you need?: No   Physical Activity: Not on file   Stress: Not on file   Social Connections: Not on file   Interpersonal Safety: Low Risk  (10/16/2023)    Interpersonal Safety     Do you feel physically and emotionally safe where you currently live?: Yes     Within the past 12 months, have you been hit, slapped, kicked or otherwise physically hurt by someone?: No     Within the past 12 months, have you been humiliated or emotionally abused in other ways by your partner or ex-partner?: No   Housing Stability: Low Risk  (10/15/2023)    Housing Stability  "    Do you have housing? : Yes     Are you worried about losing your housing?: No       Family History:  Family History   Problem Relation Age of Onset    Gastrointestinal Disease Mother         b:1931 gastric reflux    Depression Father         d:age 76 manic depression    Depression Sister         b:1964       Review of Systems:  A complete review of systems reviewed by me is negative with the exeption of what has been mentioned in the history of present illness.  In the last TWO WEEKS have you experienced any of the following symptoms?  Fevers: No  Night Sweats: No  Weight Gain: No  Pain at Night: No  Double Vision: No  Changes in Vision: No  Difficulty Breathing through Nose: No  Sore Throat in Morning: No  Dry Mouth in the Morning: Yes  Shortness of Breath Lying Flat: No  Shortness of Breath With Activity: Yes  Awakening with Shortness of Breath: No  Increased Cough: No  Heart Racing at Night: No  Swelling in Feet or Legs: No  Diarrhea at Night: No  Heartburn at Night: No  Urinating More than Once at Night: Yes  Losing Control of Urine at Night: No  Joint Pains at Night: No  Headaches in Morning: Yes  Weakness in Arms or Legs: No  Depressed Mood: Yes  Anxiety: Yes     Physical Examination:  Vitals: /85   Pulse 89   Ht 1.816 m (5' 11.5\")   Wt 91.3 kg (201 lb 3.2 oz)   SpO2 96%   BMI 27.67 kg/m    BMI= Body mass index is 27.67 kg/m .     General: Pleasant. Cooperative. In no apparent distress.  Pulmonary: Able to speak in full sentences easily with normal respiratory effort. No cough or wheeze.   Neurologic: Alert, oriented x3.Normal gait.  Normal arm swing, no resting tremors, no bradykinesia or cogwheel rigidity noted on exam.  There is slight increase in tone in left upper extremity with distraction.  Psychiatric: Mood euthymic. Affect congruent with full range and intensity.          Data: All pertinent previous laboratory data reviewed     Recent Labs   Lab Test 10/16/23  1409 12/08/22  0948   NA " 138 139   POTASSIUM 4.4 4.6   CHLORIDE 98 101   CO2 30* 25   ANIONGAP 10 13   GLC 96 103*   BUN 20.9 28.1*   CR 1.05 1.04   YARI 9.6 9.2       Recent Labs   Lab Test 12/08/22  0948   WBC 6.7   RBC 5.20   HGB 16.2   HCT 47.7   MCV 92   MCH 31.2   MCHC 34.0   RDW 12.1          Recent Labs   Lab Test 10/07/20  0727   PROTTOTAL 7.1   ALBUMIN 3.5   BILITOTAL 0.5   ALKPHOS 64   AST 20   ALT 31       TSH (mU/L)   Date Value   10/07/2020 1.20   10/21/2019 1.40     Ashok Purvis MD 5/6/2024

## 2024-05-06 NOTE — NURSING NOTE
"Chief Complaint   Patient presents with    Study Results     Sleep study results       Initial /85   Pulse 89   Ht 1.816 m (5' 11.5\")   Wt 91.3 kg (201 lb 3.2 oz)   SpO2 96%   BMI 27.67 kg/m   Estimated body mass index is 27.67 kg/m  as calculated from the following:    Height as of this encounter: 1.816 m (5' 11.5\").    Weight as of this encounter: 91.3 kg (201 lb 3.2 oz).    Medication Reconciliation: complete  ESS 10  Jennifer Bagley MA         "

## 2024-10-04 ENCOUNTER — OFFICE VISIT (OUTPATIENT)
Dept: SLEEP MEDICINE | Facility: CLINIC | Age: 64
End: 2024-10-04
Attending: PSYCHIATRY & NEUROLOGY
Payer: COMMERCIAL

## 2024-10-04 VITALS
DIASTOLIC BLOOD PRESSURE: 87 MMHG | HEIGHT: 72 IN | WEIGHT: 195.6 LBS | OXYGEN SATURATION: 96 % | BODY MASS INDEX: 26.49 KG/M2 | SYSTOLIC BLOOD PRESSURE: 122 MMHG | HEART RATE: 84 BPM | RESPIRATION RATE: 12 BRPM

## 2024-10-04 DIAGNOSIS — G47.52 RBD (REM BEHAVIORAL DISORDER): ICD-10-CM

## 2024-10-04 DIAGNOSIS — G47.30 MILD SLEEP APNEA: ICD-10-CM

## 2024-10-04 DIAGNOSIS — F51.04 CHRONIC INSOMNIA: Primary | ICD-10-CM

## 2024-10-04 PROCEDURE — 90791 PSYCH DIAGNOSTIC EVALUATION: CPT | Performed by: PSYCHOLOGIST

## 2024-10-04 RX ORDER — ESZOPICLONE 1 MG/1
1 TABLET, FILM COATED ORAL AT BEDTIME
COMMUNITY
Start: 2024-09-14

## 2024-10-04 RX ORDER — DEXTROMETHORPHAN HYDROBROMIDE, BUPROPION HYDROCHLORIDE 105; 45 MG/1; MG/1
1 TABLET, MULTILAYER, EXTENDED RELEASE ORAL 2 TIMES DAILY
COMMUNITY
Start: 2024-09-17 | End: 2024-10-22

## 2024-10-04 ASSESSMENT — SLEEP AND FATIGUE QUESTIONNAIRES
HOW LIKELY ARE YOU TO NOD OFF OR FALL ASLEEP WHEN YOU ARE A PASSENGER IN A CAR FOR AN HOUR WITHOUT A BREAK: MODERATE CHANCE OF DOZING
HOW LIKELY ARE YOU TO NOD OFF OR FALL ASLEEP IN A CAR, WHILE STOPPED FOR A FEW MINUTES IN TRAFFIC: WOULD NEVER DOZE
HOW LIKELY ARE YOU TO NOD OFF OR FALL ASLEEP WHILE SITTING AND READING: MODERATE CHANCE OF DOZING
HOW LIKELY ARE YOU TO NOD OFF OR FALL ASLEEP WHILE SITTING AND TALKING TO SOMEONE: WOULD NEVER DOZE
HOW LIKELY ARE YOU TO NOD OFF OR FALL ASLEEP WHILE WATCHING TV: MODERATE CHANCE OF DOZING
HOW LIKELY ARE YOU TO NOD OFF OR FALL ASLEEP WHILE LYING DOWN TO REST IN THE AFTERNOON WHEN CIRCUMSTANCES PERMIT: SLIGHT CHANCE OF DOZING
HOW LIKELY ARE YOU TO NOD OFF OR FALL ASLEEP WHILE SITTING QUIETLY AFTER LUNCH WITHOUT ALCOHOL: MODERATE CHANCE OF DOZING
HOW LIKELY ARE YOU TO NOD OFF OR FALL ASLEEP WHILE SITTING INACTIVE IN A PUBLIC PLACE: MODERATE CHANCE OF DOZING

## 2024-10-04 ASSESSMENT — PATIENT HEALTH QUESTIONNAIRE - PHQ9
SUM OF ALL RESPONSES TO PHQ QUESTIONS 1-9: 12
10. IF YOU CHECKED OFF ANY PROBLEMS, HOW DIFFICULT HAVE THESE PROBLEMS MADE IT FOR YOU TO DO YOUR WORK, TAKE CARE OF THINGS AT HOME, OR GET ALONG WITH OTHER PEOPLE: VERY DIFFICULT
SUM OF ALL RESPONSES TO PHQ QUESTIONS 1-9: 12

## 2024-10-04 NOTE — PROGRESS NOTES
Visit Start Time: 1:01 PM  Visit End Time: 1:55 PM      SLEEP MEDICINE CONSULTATION  Behavioral Sleep Medicine  Oct 4, 2024    Name: Tariq Perez MRN# [id number]   Age: 63 year old YOB: 1960     Date of Consultation: Oct 4, 2024  Consultation is requested by: Niranjan Lundberg MD  [address] [place] [address]  [place] [address]  Primary care provider: Sourav Herr    Reason for Sleep Consultation     Tariq Perez is a 63 year old male seen today for a behavioral sleep medicine consultation because of insomnia    Assessment and Plan     Sleep Diagnoses:      Chronic insomnia  RBD (REM behavioral disorder)  Mild sleep apnea    Co-occurring Conditions:    GERD  Major depressive disorder, mild    Clinical Impressions:    Tariq Perez was seen for a sleep psychology consultation and possible behavioral sleep intervention and treatment. History and clinical presentation is consistent with chronic psychophysiologic insomnia associated with major depressive disorder, mild sleep apnea and REM behavior disorder.  He is followed by sleep medicine for management of REM behavior disorder and states that he is benefiting from melatonin.  Sleep apnea is mostly positional and he is now sleeping laterally.  Patient has insight into behavioral and psychological treatments for insomnia and is open to a brief course of cognitive behavioral therapy for his insomnia.    Recommendations and Counselin.  Continue to follow with sleep medicine for management of REM behavior disorder including use of melatonin.    2.  Continue to follow with psychiatrist Bibi Skinner MD for management of major depressive disorder and pharmacotherapy for insomnia with eszopiclone, 1 mg.    3.  Sleep compression reducing average time in bed from 9 hours to a 7.5-hour sleep window.  (For example 10:30 PM-6 AM.)    4.  Consider introducing bright light therapy, 10,000 Lux with UV protection in the morning as there is  some evidence this can impact and improve mood as well as strengthen sleep in combination with cognitive behavioral strategies.  Check with your psychiatrist before starting.    5.  Engage in relaxing routines at least an hour before bed and low light setting.  Avoid using screens and devices later in the evening or in bed.    6.  Read and review relaxation module and cognitive modules.    Tariq was provided information about the pathophysiology of insomnia, psychophysiological factors contributing to the onset and maintenance of insomnia and how co-occurring medical conditions and intrinsic sleep disorders can affect sleep.  Treatment options were discussed  as applicable to patient specific sleep concerns and symptoms. The benefits and potential early side effects of treatment including increased daytime sleepiness were discussed.     Patient was advised to consult with their prescribing provider around use of or changes to prescription sleep medication.  Patient was counseled on the importance of avoiding driving if drowsy.    Services provided are compliant with the requirements of [place] Statute SS 256B.0625 Subd. 3b and paragraph (b)     Follow-up: 2 months     History of Present Sleep Complaint     Tariq Perez is a 63 year old year old male who presents with onset of insomnia related to first episode of depression after high school.  During depressive episodes he would have difficulty returning back to sleep at night.  He also remembers his insomnia symptoms would worsen when introduced to the various antidepressant medications he was prescribed.        Activities in bed: Other  Prescribed or OTC stimulants: none  Prescribed or OTC sleep medication: lunesta 1 mg  Previous sleep medications patients has tried: trazadone mirtazpine seroquel ambien    SLEEP-WAKE SCHEDULE:    Work/School Days: Patient goes to school/work: No     Time to Bed:  945pm  Sleep Latency: 10-15 to fall asleep  Difficulty falling  asleep:  0 nights per week  Number of awakenings: 3 times per night due to Use the bathroom  Trouble falling back asleep    times a week   Usually takes   to get back to sleep  Rise time: 645am  Uses alarm? No    Weekends/Non-work Days/All Other Days    Usually gets into bed at same   Sleep latency:  same   Rise time: same   Uses alarm? No    Patient sleep estimates:    Average total sleep time:  5-6   Patient estimate of sleep need: 7-8  Preferred sleep position(s): Back, Side     Naps    Intentional naps: 0 times a week  Duration:  0 in duration  Feels better after a nap: No  Unintentional Dozin-6 days sometimes twice a day days per week  Driving accident or near-miss due to sleepiness/drowsiness? No    SLEEP DISRUPTIONS:    Breathing/Snoring    Patient snores:Yes  Other people complain about His snoring: Yes  Patient has been told He stops breathing in His sleep: No  He has issues with any of the following: Morning mouth dryness, Getting up to urinate more than once    Movement:    Patient gets pain, discomfort, with an urge to move: No  Symptoms occur when He is resting: No  Symptoms occur more at night:No  Patient has been told He kicks His legs at night:Yes     Behaviors in Sleep:    Tariq Perez has experienced the following behaviors while sleeping: Kicking or punching    He has experienced sudden muscle weakness during the day: No    Caffeine, Alcohol and Other Substances:    Number of caffeinated beverages(per day: 2-3  Last caffeine use is usually: 3pm  Uses alcohol to promote sleep: No  Drinks alcohol near bedtime: No      FAMILY HISTORY OF SLEEP DISORDERS    Patient has a family member been diagnosed with a sleep disorder: No              SCALES     EPWORTH SLEEPINESS SCALE      Sitting and reading 2   Watching TV 2   Sitting, inactive in a public place (theatre or mt.) 2    As a passenger in a car 2   Lying down to rest in the afternoon when circumstance permit 1   Sitting and talking to  someone 0   Sitting quietly after lunch without alcohol 2   In a car, while stopped for a few minutes in traffic 0   TOTAL SCORE (nl <11) 11     INSOMNIA SEVERITY INDEX       Difficulty falling asleep 1   Difficult staying asleep 2   Problems waking up to early 3   How SATISFIED/DISSATISFIED are you with your CURRENT sleep pattern? 3   How NOTICEABLE to others do you think your sleep pattern is in terms of your quality of life? 2   How WORRIED/DISTRESSED are you about your current sleep pattern? 3   To what extent do you consider your sleep problem to INTERFERE with your daily fuctioning(e.g. daytime fatigue, mood, ability to function at work/daily chores, concentration, mood,etc.) CURRENTLY? 3   INSOMNIA SEVERITY INDEX TOTAL SCORE 17    Absence of insomnia (0-7); sub-threshold insomnia (8-14); moderate insomnia (15-21); and severe insomnia (22-28)    STOP BANG     1. Snoring: Do you snore loudly (louder than talking or loud enough to be heard through closed doors)?  Yes    2. Tired: Do you often feel tired, fatigued, or sleepy during daytime?  Yes    3. Observed: Has anyone observed you stop breathing during your sleep?  No    4. Blood pressure: Do you have or are you being treated for high blood pressure?  No    5. BMI: BMI more than 35 kg/m2?  No    6. Age: Age over 50 yr old?  Yes    7. Neck circumference: Neck circumference greater than 40 cm?  No    8. Gender: Gender male?  Yes    STOP-BANG Total Score: 4    KELVIN Risk  0-2 Low risk for KELVIN  3-4  Intermediate risk for KELVIN  5-8 High risk      PATIENT HEALTH QUESTIONNAIRE-9 (PHQ - 9)    Over the last 2 weeks, how often have you been bothered by any of the following problems?    1. Little interest or pleasure in doing things -  Nearly every day   2. Feeling down, depressed, or hopeless -  More than half the days   3. Trouble falling or staying asleep, or sleeping too much - Several days   4. Feeling tired or having little energy -  Nearly every day   5. Poor  "appetite or overeating -  Several days   6. Feeling bad about yourself - or that you are a failure or have let yourself or your family down -  More than half the days   7. Trouble concentrating on things, such as reading the newspaper or watching television - Not at all   8. Moving or speaking so slowly that other people could have noticed? Or the opposite - being so fidgety or restless that you have been moving around a lot more than usual Not at all   9. Thoughts that you would be better off dead or of hurting  yourself in some way Not at all   Total Score: 12     If you checked off any problems, how difficult have these problems made it for you to do your work, take care of things at home, or get along with other people?      Developed by Cyrus Sifuentes, Florence Bagley, Rafa Maurice and colleagues, with an educational damián from [employer] No permission required to reproduce, translate, display or distribute. permission required to reproduce, translate, display or distribute.      ERIKA-7        9/7/2021     5:17 PM   ERIKA-7 SCORE   Total Score 0       CAGE-AID    CAGE- AID Score -        No data to display                         Previous Sleep Consultations/Studies     YOB: 1960 Study Date: 4/17/2024 MRN: [id number] Referring Provider: MD Tenorio Sandeep Ordering Provider: FABI Alanis Colie Indications for Polysomnography: The patient is a 63 year old Male who is 5' 11\" and weighs 203.0 lbs. His BMI is 28.4, [place] sleepiness scale 10. Relevant medical history includes snoring, dream enactment. A diagnostic polysomnogram was performed to evaluate for sleep apnea/RBD. Polysomnogram Data: A full night polysomnogram recorded the standard physiologic parameters including EEG, EOG, EMG, ECG, nasal and oral airflow. Respiratory parameters of chest and abdominal movements were recorded with respiratory inductance plethysmography. Oxygen saturation was recorded by pulse oximetry. Hypopnea " scoring rule used: 1B 4%. Sleep Architecture: Sleep fragmentation The total recording time of the polysomnogram was 526.0 minutes. The total sleep time was 453.0 minutes. Sleep latency was 5.5 minutes. REM latency was 129.0 minutes. Arousal index was 39.6 arousals per hour. Sleep efficiency was 86.1%. Wake after sleep onset was 67.5 minutes. The patient spent 6.0% of total sleep time in Stage N1, 65.8% in Stage N2, 10.6% in Stage N3, and 17.7% in REM. Time in REM supine was 41.0 minutes. Respiration: Mild KELVIN ? Events ? The polysomnogram revealed a presence of 6 obstructive, 7 central, and - mixed apneas resulting in an apnea index of 1.7 events per hour. There were 49 obstructive hypopneas and - central hypopneas resulting in an obstructive hypopnea index of 6.5 and central hypopnea index of - events per hour. The combined apnea/hypopnea index was 8.2 events per hour (central apnea/hypopnea index was 0.9 events per hour). The REM AHI was 7.5 events per hour. The supine AHI was 14.8 events per hour. The RERA index was 6.5 events per hour. The RDI was 14.7 events per hour. ? Snoring - was reported as moderate-loud. ? Respiratory rate and pattern - was notable for normal respiratory rate and pattern. ? Sustained Sleep Associated Hypoventilation - Transcutaneous carbon dioxide monitoring was not used. ? Sleep Associated Hypoxemia - (Greater than 5 minutes O2 sat at or below 88%) was not present. Baseline oxygen saturation was 93.5%. Lowest oxygen saturation was 84.0%. Time spent less than or equal to 88% was 0.7 minutes. Time spent less than or equal to 89% was 3.3 minutes. Movement Activity: The patient had elevated motor activity during both NREM (PLMs) and REM (REM sleep without atonia) sleep. ? Periodic Limb Activity - There were 207 PLMs during the entire study. The PLM index was 27.4 movements per hour. The PLM Arousal Index was 10.2 per hour. ? REM EMG Activity - Excessive transient muscle activity was present  (mild). ? Nocturnal Behavior - Abnormal sleep related behaviors were not clearly demonstrated. ? Bruxism - None apparent. Cardiac Summary: Sinus, intermittent tachycardia The average pulse rate was 84.1 bpm. The minimum pulse rate was 71.0 bpm while the maximum pulse rate was 101.0 bpm. Assessment: ? Mild KELVIN ? The patient had elevated motor activity during both NREM (PLMs) and REM (REM sleep without atonia) sleep.   Vitals     There were no vitals taken for this visit.     Medical History     Allergies:    No Known Allergies    Medications:    Current Outpatient Medications   Medication Sig Dispense Refill    desvenlafaxine (PRISTIQ) 50 MG 24 hr tablet       eszopiclone (LUNESTA) 2 MG tablet Take 2 mg by mouth at bedtime      Magnesium Oxide 140 MG CAPS Take 140 mg by mouth at bedtime      tadalafil (CIALIS) 5 MG tablet Take 1 tablet (5 mg) by mouth daily Never use with nitroglycerin, terazosin or doxazosin. 90 tablet 3    Vitamin D, Cholecalciferol, 25 MCG (1000 UT) CAPS          Problem List:  Patient Active Problem List    Diagnosis Date Noted    Mild recurrent major depression (H) 12/08/2022     Priority: Medium    GERD (gastroesophageal reflux disease) 02/20/2012     Priority: Medium    CARDIOVASCULAR SCREENING; LDL GOAL LESS THAN 160 10/31/2010     Priority: Medium        Past Medical/Surgical History:  Past Medical History:   Diagnosis Date    Depression     Recurrent major depressive disorder, in full remission (H) 9/16/2016     Patient Active Problem List    Diagnosis Date Noted    Mild recurrent major depression (H) 12/08/2022     Priority: Medium    GERD (gastroesophageal reflux disease) 02/20/2012     Priority: Medium    CARDIOVASCULAR SCREENING; LDL GOAL LESS THAN 160 10/31/2010     Priority: Medium     Patient Active Problem List   Diagnosis    CARDIOVASCULAR SCREENING; LDL GOAL LESS THAN 160    GERD (gastroesophageal reflux disease)    Mild recurrent major depression (H)        Most Recent  Labs:  Office Visit on 10/16/2023   Component Date Value Ref Range Status    Prostate Specific Antigen Screen 10/16/2023 0.67  0.00 - 4.50 ng/mL Final    Cholesterol 10/16/2023 187  <200 mg/dL Final    Triglycerides 10/16/2023 74  <150 mg/dL Final    Direct Measure HDL 10/16/2023 68  >=40 mg/dL Final    LDL Cholesterol Calculated 10/16/2023 104 (H)  <=100 mg/dL Final    Non HDL Cholesterol 10/16/2023 119  <130 mg/dL Final    Sodium 10/16/2023 138  135 - 145 mmol/L Final    Reference intervals for this test were updated on 09/26/2023 to more accurately reflect our healthy population. There may be differences in the flagging of prior results with similar values performed with this method. Interpretation of those prior results can be made in the context of the updated reference intervals.     Potassium 10/16/2023 4.4  3.4 - 5.3 mmol/L Final    Chloride 10/16/2023 98  98 - 107 mmol/L Final    Carbon Dioxide (CO2) 10/16/2023 30 (H)  22 - 29 mmol/L Final    Anion Gap 10/16/2023 10  7 - 15 mmol/L Final    Urea Nitrogen 10/16/2023 20.9  8.0 - 23.0 mg/dL Final    Creatinine 10/16/2023 1.05  0.67 - 1.17 mg/dL Final    GFR Estimate 10/16/2023 81  >60 mL/min/1.73m2 Final    Calcium 10/16/2023 9.6  8.8 - 10.2 mg/dL Final    Glucose 10/16/2023 96  70 - 99 mg/dL Final       Mental Health History     Prior Mental Health Diagnosis:   Major Depressive Disorder    Mental Health Treatment:   Psychiatry care medication management, ; Bibi Skinner MD,   Desvenlafaxine, Bupropion, eszopiclone   Chemical Abuse/Treatment:    None reported    Family History     Family History   Problem Relation Age of Onset    Gastrointestinal Disease Mother         b:1931 gastric reflux    Depression Father         d:age 76 manic depression    Depression Sister         b:1964       Social History         Social Determinants of Health     Food Insecurity: Low Risk  (10/15/2023)    Food Insecurity     Within the past 12 months, did you worry that your food  would run out before you got money to buy more?: No     Within the past 12 months, did the food you bought just not last and you didn t have money to get more?: No   Depression: At risk (10/16/2023)    PHQ-2     PHQ-2 Score: 6   Housing Stability: Low Risk  (10/15/2023)    Housing Stability     Do you have housing? : Yes     Are you worried about losing your housing?: No   Tobacco Use: Low Risk  (5/6/2024)    Patient History     Smoking Tobacco Use: Never     Smokeless Tobacco Use: Never     Passive Exposure: Not on file   Financial Resource Strain: Low Risk  (10/15/2023)    Financial Resource Strain     Within the past 12 months, have you or your family members you live with been unable to get utilities (heat, electricity) when it was really needed?: No   Alcohol Use: Not on file   Transportation Needs: Low Risk  (10/15/2023)    Transportation Needs     Within the past 12 months, has lack of transportation kept you from medical appointments, getting your medicines, non-medical meetings or appointments, work, or from getting things that you need?: No   Physical Activity: Not on file   Interpersonal Safety: Low Risk  (10/16/2023)    Interpersonal Safety     Do you feel physically and emotionally safe where you currently live?: Yes     Within the past 12 months, have you been hit, slapped, kicked or otherwise physically hurt by someone?: No     Within the past 12 months, have you been humiliated or emotionally abused in other ways by your partner or ex-partner?: No   Stress: Not on file   Social Connections: Not on file   Health Literacy: Not on file         Mental Status Examination     Tariq presented as oriented X3 with speech and language intact.  The patient was cooperative throughout the evaluation with no signs of hallucinations, delusions, loosening of associations or other thought disturbance.  Mood was normal Affect was congruent with mood. Insight and judgement were intact.  Memory appeared intact for recent  and remote elements.  There was no report of suicidal ideation, intention or plan. Attention and concentration were within normal.        Niranjan Maldonado, Helene, LP, DBSM  Diplomate, Behavioral Sleep Medicine  [facility]      Copy:   Sourav Herr MD  [facility] [address] [address]    Note: This dictation was created using voice recognition software. This document may contain an error not identified before finalizing the document. If the error changes the accuracy of the document, I would appreciate it being brought to my attention.

## 2024-10-04 NOTE — NURSING NOTE
"Chief Complaint   Patient presents with    Sleep Problem     Patient presents to clinic for a psychological consult on his psychophysiological insomnia [F51.04]. Referred by Niranjan Lundberg MD. History of depression, last episode won't go away. Would like to get off a sleep medications.       Initial /87   Pulse 84   Resp 12   Ht 1.816 m (5' 11.5\")   Wt 88.7 kg (195 lb 9.6 oz)   SpO2 96%   BMI 26.90 kg/m   Estimated body mass index is 26.9 kg/m  as calculated from the following:    Height as of this encounter: 1.816 m (5' 11.5\").    Weight as of this encounter: 88.7 kg (195 lb 9.6 oz).    Medication Reconciliation: complete  Neck circumference: 15.75 inches / 40 centimeters.  DME: N/A  Rebeca Harper CMA      "

## 2024-10-07 NOTE — PATIENT INSTRUCTIONS
Recommendations and Counselin.  Continue to follow with sleep medicine for management of REM behavior disorder including use of melatonin.    2.  Continue to follow with psychiatrist Bibi Skinner MD for management of major depressive disorder and pharmacotherapy for insomnia with eszopiclone, 1 mg.    3.  Sleep compression reducing average time in bed from 9 hours to a 7.5-hour sleep window.  (For example 10:30 PM-6 AM.)    4.  Consider introducing bright light therapy, 10,000 Lux with UV protection in the morning as there is some evidence this can impact and improve mood as well as strengthen sleep in combination with cognitive behavioral strategies.  Check with your psychiatrist before starting.    5.  Engage in relaxing routines at least an hour before bed and low light setting.  Avoid using screens and devices later in the evening or in bed.    6.  Read and review relaxation module and cognitive modules.    Bright Light Therapy for Sleep Disorders    The use of bright light therapy has been shown to be helpful in the management of some types of insomnia and circadian rhythm disorders.    Bright light therapy helps regulate and strengthen the circadian sleep-wake system, one of the two primary biological systems regulating sleep.    Getting sufficient exposure to ambient light in the mornings, and for some in the evenings, can be helpful in maintaining healthy sleep.    Bright light therapy isn't for everyone.  Use of a bright light box may be contraindicated for individuals with a history of seizure disorder, migraines, bipolar disorder, skin cancer, or eye disease. Talk with your primary care provider before beginning bright light therapy.    It is recommended that for treatment of sleep disorders that a bright light box display full-spectrum light at an intensity of 10,000 Lux with UV Protection.  Light boxes can be purchased from a number of online or retail chains. The cost can vary but usually  can be readily purchased for $ depending on features.     [facility] Brief CBT-I   Lesson 4: Relaxation and Sleep       Relaxation and Sleep    A relaxed mind and body sets the stage for quality sleep.  Bright light, over-stimulation, stress, tension and conflict increase mental and physical arousal.  This in turn can delay and fragment your sleep.    Research shows relaxing before bed can reduce the time it takes to fall asleep and improve sleep quality.  Relaxation training works by reducing physical, emotional and mental arousal that can interfere with your sleep.     Winding Down Time    A wind-down time before you go to bed can help you relax your mind and body. It helps transition from a busy day to bedtime. Examples include:    Listening to calm music  Reading  Watching a pleasant or relaxing TV show in a dimly lit room.  Taking a bath  Setting aside your laptop, tablet and mobile phone    We recommend you set a reminder to begin your wind down time one hour before bedtime.      Relaxation Techniques      Relaxation skills are easy to learn on your own using widely available free mobile apps or online resources.  We recommend doing some form of relaxation exercise daily for at least 10 minutes.  This can include short breathing exercises used throughout the day to help manage stress. Examples of widely used mobile apps include:    CBT-I : In the Tools section open the Quiet Your Mind        section for a number of guided relaxation exercises.    Insomnia :  In the Tools section open the Relax Your        Body or Quiet Your Mind sections for guided relaxation       exercises.    Calm:  Includes free, for-purchase and subscription relaxation       exercises.    Insight Timer:  Includes free, for-purchase and subscription       relaxation exercises.    NOTE:  Do not use guided relaxation applications while driving or operating equipment.      The Constructive Worry Technique     The  Constructive Worry Technique is designed to help manage worry and an overactive mind before bed and during the night.    All human beings worry.  Uncontrolled worry can affect your sleep and health by activating your arousal system.  Worry makes your brain, body and heart behave like there is a danger or threat.  This stress response can make it more difficult to fall asleep and stay asleep. The most common types of worry include:    Concerned about unfinished tasks  Concern over family, finances or work  Worry about things beyond your control    Scheduling Constructive Worry Time    The part of our brain that plans, sorts, and helps manage our emotions is less effective in doing its job as nighttime comes.  Without the usual distractions of the day, we tend to worry more and have trouble putting aside our worries.    A simple technique called Constructive Worry Time can help to reduce and manage your over-active mind or worry as you approach bedtime or in the middle of the night. It is most effective when practiced daily.      [facility] Brief CBT-I   Lesson 3: Cognitive Restructuring    Developing Healthy Sleep Thoughts    Insomnia is often is triggered by stressful events such as a change in employment, a separation, medical illness, or loss.  How you handle your sleep problem, mainly your thoughts and habits, determines in large part whether your sleeplessness is short-term or develops into chronic insomnia well after the stressful event is over.     This part of your training involves learning the information and skills to identify and change negative thoughts about sleep that can cause emotional and physical arousal. This strengthens wakefulness and weakens your sleep system.  This in turn creates a vicious cycle that results in sleep performance anxiety and unhelpful sleep effort.     Changing How You Think About Insomnia    We now know that our thoughts and attitudes affect our stress response.   Negative sleep thoughts can worsen your insomnia. They can lead to greater fear or anxiety about sleep, which in turn can aggravate your sleep problem. Thinking more positively and realistically about your sleep reduces sleep stress and can promote better sleep.     Myths about Sleep    Lets review some of the most common myths and mistaken believes about insomnia and sleep.    People need 8 hours of sleep     This is untrue.  Sleep needs vary from person to person.  Most adults need at least 7 hours to feel alert during the day.  However some need less, and others more. People who sleep 7-8 hours live longer than those who are long sleepers.  Consistently getting less than 5 hours of sleep can have an adverse effect on physical and mental health.    Insomnia Causes Dementia     Researchers are not sure how sleep and dementia are linked. There is currently no conclusive evidence that insomnia causes dementia.  We do know that the rate of Insomnia is higher in a host of health conditions that have been associated with increased risk of certain types of dementia.      Insomnia is the same as sleep deprivation    No. Sleep deprivation is a lack of sleep due to circumstances or behaviors that prevent enough available time for sleep. By contrast, insomnia involves insufficient or poor quality of sleep when there is enough time for sleep, and in some cases extended time available for sleep.  In fact, a common strategy to cope with insomnia is to increase or extend time in bed.well beyond the number of hours sleep they need.  Another strategies is to extend time in bed on weekends in the hope of catching up on sleep. These habits can actually harm sleep making it more fragmented and increasing the time awake during the night.    How long you sleep is the most important thing            Sleep duration is important but not the end of the story. The        quality of your sleep is also critical to good health and functioning.  Frequent awakening can disrupt your natural sleep cycles and decrease your time in the most restorative stages of sleep.     We need less sleep as we age     Not true. Most older adults need about as much sleep as other adults.  However, sleep may become lighter as we age with more awakenings at night.  Older adults also tend to fall asleep earlier in the evening and wake up earlier in the morning.    You are Getting More Sleep than You Think    Research using objective sleep tests reveal that people with insomnia get an average of one hour more sleep than they think. This is due in part to the brain misperceiving Stage 1 and 2 sleep as being awake.  In addition, stress and arousal while awake in bed changes the brain's perception of time awake.    Examples of Unhealthy Sleep Thoughts    Negative sleep thoughts can have a profound impact on your ability to get a good night's sleep. Below are some examples of negative thoughts associated with insomnia that may sound familiar to you:    I must get 8 hours of sleep to function during the day.    I won't get to sleep tonight.    Insomnia is going to cause health problems.    I am dreading going to bed.    I woke up early again.  I know I won't get back to sleep.    The reason I feel terrible today is because of my insomnia.    I've totally lost control of my sleep.    I can't sleep without a sleeping pill.    Negative thoughts usually occur automatically and feel like a knee-jerk reaction.  They are often untrue or distorted, especially late in the evening as you become increasingly tired and others are asleep.      Changing Unhealthy Sleep Thoughts    Now that you understand the impact that negative thoughts can have on your sleep, you are ready to change these unhelpful thoughts.  The strategy is powerful and simple:  By recognizing and replacing your negative thoughts about sleep with more accurate, positive thoughts, you will be less anxious and frustrated about  your sleep. A more realistic and positive attitude about sleep will allow you to relax and sleep more easily through the night.         There are several important steps involved in changing your unhelpful and negative thoughts about sleep:      Examples of Healthy Sleep Thoughts    My work will not suffer much if I have a poor night's sleep.    I'm probably getting more sleep than I think.    Other things than my sleep affect my daytime functioning.    Because I didn't sleep well last night, I am more likely to sleep well tonight because of increased sleep drive that leads to deeper sleep.    Sleep requirements vary from one person to another.    If I don't sleep well, I'll be able to do my regular activities and manage any tiredness I feel.    If I awaken after about 5 hours of sleep, I have gotten the core sleep I need for the day.    I'm more likely to fall asleep the longer I've been awake    I'm more likely to fall asleep as my body temperature begins to decrease through the night.    My body's wakefulness system takes charge during the day to promote daytime functioning.    These sleep skills have worked for others, and they can work for me.    Other Recommendations for Changing Beliefs and Attitudes   About Your Sleep    Keep Realistic Expectations     There is a widespread and mistaken belief that 8 hours of sleep is necessary to feel refreshed and function well during the day. Many believe that good sleep means never waking up at night.  Others come to expect that they should always wake up in the morning feeling full of energy.  Concerns may arise when your actual sleep falls short of these expectations. Try to avoid placing undue pressure on yourself to achieve certain sleep levels.  It only increases anxiety about sleeping.  Focus on quality sleep not quantity of sleep.    Revise Your Thoughts about the Causes of Insomnia    There is a natural tendency to attribute our sleep problems completely to  external factors such as a chemical imbalance, pain, aging or things over which we may have little control.  Although these factors may contribute to your insomnia, research shows CBT-I is beneficial even if they are present.    Don't Blame Insomnia for All Daytime Impairments      Many individuals blame insomnia for every symptom or concern they experience during the day from fatigue to lack of concentration. Though poor sleep may produce some of these symptoms, it us usually untrue that all daytime impairment is attributable to insomnia.  It is more often that other factors such as stress and co-occurring medical problems contribute more to how you feel during the day.      Don't Catastrophize      Catastrophic thinking means making a sleep mountain out of a molehill.  Some people believe poor sleep will have catastrophic consequences to their physical health, mental health and appearance. Others see insomnia as a complete loss of control.  These perceived consequences of insomnia often prompt people to seek medication or other treatment.  Keep in mind insomnia can be very unpleasant but for the most part is not dangerous.    Don't Focus on Sleep     Some people reduce their activity level because of poor sleep.  Although sleep is a necessary part of life, don't make it the focus of your thoughts and concern. Trust that if you engage in health sleep habits, your body will give you the sleep it needs.  Make sure you continue your normal activities despite your insomnia.    Never Try to Sleep      Of all the habits the most important one is this:  Never try to force yourself to sleep.  Doing so usually backfires and makes things worse. Instead, focus on keeping to your prescribed sleep schedule, getting up at the same time every day and using the bed only for sleep.     What are Your Three Top Negative Thoughts About Your Sleep?        Negative Thought                   Resulting Feeling               Alternate  "Thought  I must get 8 hours of sleep                   Fear                      People need different amounts   night or my health will suffer                                              of sleep and sleep time varies                                                                                             Somewhat from night to night  1.____________________________________________________________________    2.____________________________________________________________________    3.____________________________________________________________________        Turning the Page on Sleeping Pills    If you are taking sleep medication and feel ready to reduce how much  you take, consider sleep reduction strategies while you also continue to identify and address negative sleep thoughts.    Research indicates that combining CBT-I with sleep medication tapering is far more successful that tapering alone.     Key Points    You can flexibly reduce sleep medication use at your own pace  Avoids sudden discontinuation \"cold turkey\" which can cause increased sleep anxiety and insomnia.    Before you Proceed    Since regular user  of sleep medication may experience withdrawal symptoms or a rebound in their insomnia, it is important you avoid abrupt discontinuation of your medication and discuss the following medication reduction strategies with your prescribing provider.    Sleep Medication Reduction Strategies     Write down how often you take each sleeping pill and the dose.    2.   If you are taking two or more sleeping pill, decide which one you want         to taper first.    3.   If you are not following prescription directions, do so. For example,         don't take your medication in the middle of the night if you are        directed to take your medication at bedtime.    4.   Start with reducing your sleep medication by half two nights a week,         the nights.  Consider easier nights when there is less     "     stress or demands.    5.  The second week, take the reduced dose on 4 nights, spacing each        Night with the regular dose.    6.  The third week, take the reduce dosed every night of the week.    7.  Repeat the above step 4, 5 and 6 by eliminating the medication entirely       two nights a week.    8.  If you are taking two or more sleep medications, consider repeating       the process for each additional medication.

## 2024-10-19 SDOH — HEALTH STABILITY: PHYSICAL HEALTH: ON AVERAGE, HOW MANY MINUTES DO YOU ENGAGE IN EXERCISE AT THIS LEVEL?: PATIENT DECLINED

## 2024-10-19 SDOH — HEALTH STABILITY: PHYSICAL HEALTH: ON AVERAGE, HOW MANY DAYS PER WEEK DO YOU ENGAGE IN MODERATE TO STRENUOUS EXERCISE (LIKE A BRISK WALK)?: 2 DAYS

## 2024-10-19 ASSESSMENT — SOCIAL DETERMINANTS OF HEALTH (SDOH): HOW OFTEN DO YOU GET TOGETHER WITH FRIENDS OR RELATIVES?: ONCE A WEEK

## 2024-10-22 ENCOUNTER — OFFICE VISIT (OUTPATIENT)
Dept: FAMILY MEDICINE | Facility: CLINIC | Age: 64
End: 2024-10-22
Payer: COMMERCIAL

## 2024-10-22 VITALS
RESPIRATION RATE: 16 BRPM | TEMPERATURE: 97.4 F | HEART RATE: 83 BPM | HEIGHT: 71 IN | WEIGHT: 198.1 LBS | BODY MASS INDEX: 27.73 KG/M2 | OXYGEN SATURATION: 99 % | DIASTOLIC BLOOD PRESSURE: 84 MMHG | SYSTOLIC BLOOD PRESSURE: 127 MMHG

## 2024-10-22 DIAGNOSIS — Z23 ENCOUNTER FOR IMMUNIZATION: ICD-10-CM

## 2024-10-22 DIAGNOSIS — Z00.00 ROUTINE GENERAL MEDICAL EXAMINATION AT A HEALTH CARE FACILITY: Primary | ICD-10-CM

## 2024-10-22 DIAGNOSIS — N40.1 BENIGN NON-NODULAR PROSTATIC HYPERPLASIA WITH LOWER URINARY TRACT SYMPTOMS: ICD-10-CM

## 2024-10-22 DIAGNOSIS — Z12.5 SCREENING PSA (PROSTATE SPECIFIC ANTIGEN): ICD-10-CM

## 2024-10-22 DIAGNOSIS — R55 SYNCOPE, UNSPECIFIED SYNCOPE TYPE: ICD-10-CM

## 2024-10-22 DIAGNOSIS — F33.0 MILD EPISODE OF RECURRENT MAJOR DEPRESSIVE DISORDER (H): ICD-10-CM

## 2024-10-22 DIAGNOSIS — G47.52 REM BEHAVIORAL DISORDER: ICD-10-CM

## 2024-10-22 LAB
ANION GAP SERPL CALCULATED.3IONS-SCNC: 9 MMOL/L (ref 7–15)
BUN SERPL-MCNC: 26.3 MG/DL (ref 8–23)
CALCIUM SERPL-MCNC: 9.6 MG/DL (ref 8.8–10.4)
CHLORIDE SERPL-SCNC: 102 MMOL/L (ref 98–107)
CHOLEST SERPL-MCNC: 222 MG/DL
CREAT SERPL-MCNC: 1.05 MG/DL (ref 0.67–1.17)
EGFRCR SERPLBLD CKD-EPI 2021: 80 ML/MIN/1.73M2
ERYTHROCYTE [DISTWIDTH] IN BLOOD BY AUTOMATED COUNT: 12.2 % (ref 10–15)
FASTING STATUS PATIENT QL REPORTED: YES
FASTING STATUS PATIENT QL REPORTED: YES
GLUCOSE SERPL-MCNC: 91 MG/DL (ref 70–99)
HCO3 SERPL-SCNC: 27 MMOL/L (ref 22–29)
HCT VFR BLD AUTO: 47.4 % (ref 40–53)
HDLC SERPL-MCNC: 74 MG/DL
HGB BLD-MCNC: 15.9 G/DL (ref 13.3–17.7)
LDLC SERPL CALC-MCNC: 134 MG/DL
MCH RBC QN AUTO: 31.7 PG (ref 26.5–33)
MCHC RBC AUTO-ENTMCNC: 33.5 G/DL (ref 31.5–36.5)
MCV RBC AUTO: 94 FL (ref 78–100)
NONHDLC SERPL-MCNC: 148 MG/DL
PLATELET # BLD AUTO: 207 10E3/UL (ref 150–450)
POTASSIUM SERPL-SCNC: 4.8 MMOL/L (ref 3.4–5.3)
PSA SERPL DL<=0.01 NG/ML-MCNC: 0.94 NG/ML (ref 0–4.5)
RBC # BLD AUTO: 5.02 10E6/UL (ref 4.4–5.9)
SODIUM SERPL-SCNC: 138 MMOL/L (ref 135–145)
TRIGL SERPL-MCNC: 69 MG/DL
WBC # BLD AUTO: 5.8 10E3/UL (ref 4–11)

## 2024-10-22 PROCEDURE — 80061 LIPID PANEL: CPT | Performed by: PHYSICIAN ASSISTANT

## 2024-10-22 PROCEDURE — 99213 OFFICE O/P EST LOW 20 MIN: CPT | Mod: 25 | Performed by: PHYSICIAN ASSISTANT

## 2024-10-22 PROCEDURE — 99396 PREV VISIT EST AGE 40-64: CPT | Mod: 25 | Performed by: PHYSICIAN ASSISTANT

## 2024-10-22 PROCEDURE — 36415 COLL VENOUS BLD VENIPUNCTURE: CPT | Performed by: PHYSICIAN ASSISTANT

## 2024-10-22 PROCEDURE — 90471 IMMUNIZATION ADMIN: CPT | Performed by: PHYSICIAN ASSISTANT

## 2024-10-22 PROCEDURE — 90480 ADMN SARSCOV2 VAC 1/ONLY CMP: CPT | Performed by: PHYSICIAN ASSISTANT

## 2024-10-22 PROCEDURE — G0103 PSA SCREENING: HCPCS | Performed by: PHYSICIAN ASSISTANT

## 2024-10-22 PROCEDURE — 91320 SARSCV2 VAC 30MCG TRS-SUC IM: CPT | Performed by: PHYSICIAN ASSISTANT

## 2024-10-22 PROCEDURE — 85027 COMPLETE CBC AUTOMATED: CPT | Performed by: PHYSICIAN ASSISTANT

## 2024-10-22 PROCEDURE — 90656 IIV3 VACC NO PRSV 0.5 ML IM: CPT | Performed by: PHYSICIAN ASSISTANT

## 2024-10-22 PROCEDURE — 80048 BASIC METABOLIC PNL TOTAL CA: CPT | Performed by: PHYSICIAN ASSISTANT

## 2024-10-22 ASSESSMENT — PATIENT HEALTH QUESTIONNAIRE - PHQ9
SUM OF ALL RESPONSES TO PHQ QUESTIONS 1-9: 11
SUM OF ALL RESPONSES TO PHQ QUESTIONS 1-9: 11
10. IF YOU CHECKED OFF ANY PROBLEMS, HOW DIFFICULT HAVE THESE PROBLEMS MADE IT FOR YOU TO DO YOUR WORK, TAKE CARE OF THINGS AT HOME, OR GET ALONG WITH OTHER PEOPLE: VERY DIFFICULT

## 2024-10-22 ASSESSMENT — PAIN SCALES - GENERAL: PAINLEVEL: MILD PAIN (2)

## 2024-10-22 NOTE — PROGRESS NOTES
"Preventive Care Visit  [facility]  Sourav Herr PA-C, Physician Assistant  Oct 22, 2024      Assessment & Plan     (Z00.00) Routine general medical examination at a health care facility  (primary encounter diagnosis)  Comment:   Plan: Basic metabolic panel  (Ca, Cl, CO2, Creat,         Gluc, K, Na, BUN), Lipid panel reflex to direct        LDL Fasting, CBC with platelets          Screening labs and vaccines updated today, overall doing well    (F33.0) Mild episode of recurrent major depressive disorder (H)  Comment:   Plan: stable, managed by outside provider    (Z12.5) Screening PSA (prostate specific antigen)  Comment:   Plan: PSA, screen            (G47.52) REM behavioral disorder  Comment:   Plan: Adult Neurology  Referral        stable, managed by outside provider    (R55) Syncope, unspecified syncope type  Comment:   Plan: Adult Neurology  Referral        Single episode after warm weather and working in the year, labs workup today, consider cardiac workup in the future if recurrence.    (N40.1) Benign non-nodular prostatic hyperplasia with lower urinary tract symptoms  Comment:   Plan: stop tadalafil at this time, he will update me in 3-4 weeks with sxs.  If an increase in nocturia, consider tamsulosin    (Z23) Encounter for immunization  Comment:   Plan:     BMI  Estimated body mass index is 27.34 kg/m  as calculated from the following:    Height as of this encounter: 1.813 m (5' 11.38\").    Weight as of this encounter: 89.9 kg (198 lb 1.6 oz).       Counseling  Appropriate preventive services were addressed with this patient via screening, questionnaire, or discussion as appropriate for fall prevention, nutrition, physical activity, Tobacco-use cessation, social engagement, weight loss and cognition.  Checklist reviewing preventive services available has been given to the patient.  Reviewed patient's diet, addressing concerns and/or questions.   He is at " risk for lack of exercise and has been provided with information to increase physical activity for the benefit of his well-being.   He is at risk for psychosocial distress and has been provided with information to reduce risk.   The patient's PHQ-9 score is consistent with moderate depression. He was provided with information regarding depression.           Fernando Potter is a 63 year old, presenting for the following:  Physical (Passed out on Sunday briefly and unsure of the reason as to why. /Seeing in neurologist due to memory concerns and unable to follow instructions./Anti depression side effects - acting out in his sleep. Following up with a sleep psychiatrists./Frequent urination - should we look at options or change of dosage. Has worsened. )        10/22/2024    10:39 AM   Additional Questions   Roomed by Cristel VARGAS   Accompanied by Wife        Health Care Directive  Patient does not have a Health Care Directive or Living Will: Discussed advance care planning with patient; information given to patient to review.    HPI    Patient with a history of major depressive disorder, currently moderate, being managed by outside psychiatrist and therapist, no recent change in Lunesta dose.     Patient also with a history of BPH and erectile dysfunction, currently on tadalafil 5 mg once daily, no medication side effects reported by the patient, but also wondering about taking a drug holiday, not seeing the benefit from meds at this time, nocturia 1-2x per night at this time, previously on finasteride without significant benefit        10/19/2024   General Health   How would you rate your overall physical health? Good   Feel stress (tense, anxious, or unable to sleep) Only a little      (!) STRESS CONCERN      10/19/2024   Nutrition   Three or more servings of calcium each day? Yes   Diet: Regular (no restrictions)   How many servings of fruit and vegetables per day? (!) 2-3   How many sweetened beverages each day? 0-1             10/19/2024   Exercise   Days per week of moderate/strenous exercise 2 days   Average minutes spent exercising at this level Patient declined      (!) EXERCISE CONCERN      10/19/2024   Social Factors   Frequency of gathering with friends or relatives Once a week   Worry food won't last until get money to buy more No   Food not last or not have enough money for food? No   Do you have housing? (Housing is defined as stable permanent housing and does not include staying ouside in a car, in a tent, in an abandoned building, in an overnight shelter, or couch-surfing.) Yes   Are you worried about losing your housing? No   Lack of transportation? No   Unable to get utilities (heat,electricity)? No            10/19/2024   Fall Risk   Fallen 2 or more times in the past year? No   Trouble with walking or balance? No             10/19/2024   Dental   Dentist two times every year? Yes            10/19/2024   TB Screening   Were you born outside of the [place]? No          Today's PHQ-9 Score:       10/22/2024     7:59 AM   PHQ-9 SCORE   PHQ-9 Total Score MyChart 11 (Moderate depression)   PHQ-9 Total Score 11         10/19/2024   Substance Use   Alcohol more than 3/day or more than 7/wk No   Do you use any other substances recreationally? No        Social History     Tobacco Use    Smoking status: Never     Passive exposure: Never    Smokeless tobacco: Never   Vaping Use    Vaping status: Never Used   Substance Use Topics    Alcohol use: Yes     Comment: Very seldom    Drug use: No           10/19/2024   STI Screening   New sexual partner(s) since last STI/HIV test? No      Last PSA:   PSA   Date Value Ref Range Status   10/07/2020 1.12 0 - 4 ug/L Final     Comment:     Assay Method:  Chemiluminescence using Siemens Vista analyzer     Prostate Specific Antigen Screen   Date Value Ref Range Status   10/16/2023 0.67 0.00 - 4.50 ng/mL Final     ASCVD Risk   The 10-year ASCVD risk score (Farideh BAI, et al., 2019) is:  "7.7%    Values used to calculate the score:      Age: 63 years      Sex: Male      Is Non- : No      Diabetic: No      Tobacco smoker: No      Systolic Blood Pressure: 127 mmHg      Is BP treated: No      HDL Cholesterol: 68 mg/dL      Total Cholesterol: 187 mg/dL           Reviewed and updated as needed this visit by Provider                             Objective    Exam  /84   Pulse 83   Temp 97.4  F (36.3  C) (Temporal)   Resp 16   Ht 1.813 m (5' 11.38\")   Wt 89.9 kg (198 lb 1.6 oz)   SpO2 99%   BMI 27.34 kg/m     Estimated body mass index is 27.34 kg/m  as calculated from the following:    Height as of this encounter: 1.813 m (5' 11.38\").    Weight as of this encounter: 89.9 kg (198 lb 1.6 oz).    Physical Exam  GENERAL: alert and no distress  NECK: no adenopathy, no asymmetry, masses, or scars  RESP: lungs clear to auscultation - no rales, rhonchi or wheezes  CV: regular rate and rhythm, normal S1 S2, no S3 or S4, no murmur, click or rub, no peripheral edema  ABDOMEN: soft, nontender, no hepatosplenomegaly, no masses and bowel sounds normal  MS: no gross musculoskeletal defects noted, no edema  SKIN: no suspicious lesions or rashes  NEURO: Normal strength and tone, mentation intact and speech normal  BACK: no CVA tenderness, no paralumbar tenderness        Signed Electronically by: Sourav Herr PA-C    Answers submitted by the patient for this visit:  Patient Health Questionnaire (Submitted on 10/22/2024)  If you checked off any problems, how difficult have these problems made it for you to do your work, take care of things at home, or get along with other people?: Very difficult  PHQ9 TOTAL SCORE: 11    "

## 2024-10-22 NOTE — PROGRESS NOTES
Prior to immunization administration, verified patients identity using patient s name and date of birth. Please see Immunization Activity for additional information.     Is the patient's temperature normal (100.5 or less)? Yes     Patient MEETS CRITERIA. PROCEED with vaccine administration.      Patient instructed to remain in clinic for 15 minutes afterwards, and to report any adverse reactions.      Link to Ancillary Visit Immunization Standing Orders SmartSet     Screening performed by Roland Orozco MA on 10/22/2024 at 11:53 AM.

## 2024-10-22 NOTE — PATIENT INSTRUCTIONS
Patient Education   Preventive Care Advice   This is general advice given by our system to help you stay healthy. However, your care team may have specific advice just for you. Please talk to your care team about your preventive care needs.  Nutrition  Eat 5 or more servings of fruits and vegetables each day.  Try wheat bread, brown rice and whole grain pasta (instead of white bread, rice, and pasta).  Get enough calcium and vitamin D. Check the label on foods and aim for 100% of the RDA (recommended daily allowance).  Lifestyle  Exercise at least 150 minutes each week  (30 minutes a day, 5 days a week).  Do muscle strengthening activities 2 days a week. These help control your weight and prevent disease.  No smoking.  Wear sunscreen to prevent skin cancer.  Have a dental exam and cleaning every 6 months.  Yearly exams  See your health care team every year to talk about:  Any changes in your health.  Any medicines your care team has prescribed.  Preventive care, family planning, and ways to prevent chronic diseases.  Shots (vaccines)   HPV shots (up to age 26), if you've never had them before.  Hepatitis B shots (up to age 59), if you've never had them before.  COVID-19 shot: Get this shot when it's due.  Flu shot: Get a flu shot every year.  Tetanus shot: Get a tetanus shot every 10 years.  Pneumococcal, hepatitis A, and RSV shots: Ask your care team if you need these based on your risk.  Shingles shot (for age 50 and up)  General health tests  Diabetes screening:  Starting at age 35, Get screened for diabetes at least every 3 years.  If you are younger than age 35, ask your care team if you should be screened for diabetes.  Cholesterol test: At age 39, start having a cholesterol test every 5 years, or more often if advised.  Bone density scan (DEXA): At age 50, ask your care team if you should have this scan for osteoporosis (brittle bones).  Hepatitis C: Get tested at least once in your life.  STIs (sexually  transmitted infections)  Before age 24: Ask your care team if you should be screened for STIs.  After age 24: Get screened for STIs if you're at risk. You are at risk for STIs (including HIV) if:  You are sexually active with more than one person.  You don't use condoms every time.  You or a partner was diagnosed with a sexually transmitted infection.  If you are at risk for HIV, ask about PrEP medicine to prevent HIV.  Get tested for HIV at least once in your life, whether you are at risk for HIV or not.  Cancer screening tests  Cervical cancer screening: If you have a cervix, begin getting regular cervical cancer screening tests starting at age 21.  Breast cancer scan (mammogram): If you've ever had breasts, begin having regular mammograms starting at age 40. This is a scan to check for breast cancer.  Colon cancer screening: It is important to start screening for colon cancer at age 45.  Have a colonoscopy test every 10 years (or more often if you're at risk) Or, ask your provider about stool tests like a FIT test every year or Cologuard test every 3 years.  To learn more about your testing options, visit:   .  For help making a decision, visit:   https://bit.ly/pv04906.  Prostate cancer screening test: If you have a prostate, ask your care team if a prostate cancer screening test (PSA) at age 55 is right for you.  Lung cancer screening: If you are a current or former smoker ages 50 to 80, ask your care team if ongoing lung cancer screenings are right for you.  For informational purposes only. Not to replace the advice of your health care provider. Copyright   2023 [facility]. All rights reserved. Clinically reviewed by the [facility] Transitions Program. [employer] [id number] - REV 01/24.  Learning About Depression Screening  What is depression screening?  Depression screening is a way to see if you have depression symptoms. It may be done by a doctor or counselor. It's often part of a routine  "checkup. That's because your mental health is just as important as your physical health.  Depression is a mental health condition that affects how you feel, think, and act. You may:  Have less energy.  Lose interest in your daily activities.  Feel sad and grouchy for a long time.  Depression is very common. It affects people of all ages.  Many things can lead to depression. Some people become depressed after they have a stroke or find out they have a major illness like cancer or heart disease. The death of a loved one or a breakup may lead to depression. It can run in families. Most experts believe that a combination of inherited genes and stressful life events can cause it.  What happens during screening?  You may be asked to fill out a form about your depression symptoms. You and the doctor will discuss your answers. The doctor may ask you more questions to learn more about how you think, act, and feel.  What happens after screening?  If you have symptoms of depression, your doctor will talk to you about your options.  Doctors usually treat depression with medicines or counseling. Often, combining the two works best. Many people don't get help because they think that they'll get over the depression on their own. But people with depression may not get better unless they get treatment.  The cause of depression is not well understood. There may be many factors involved. But if you have depression, it's not your fault.  A serious symptom of depression is thinking about death or suicide. If you or someone you care about talks about this or about feeling hopeless, get help right away.  It's important to know that depression can be treated. Medicine, counseling, and self-care may help.  Where can you learn more?  Go to https://www.Trovebox.net/patiented  Enter T185 in the search box to learn more about \"Learning About Depression Screening.\"  Current as of: June 24, 2023  Content Version: 14.2 2024 [employer].   Care instructions adapted under license by your healthcare professional. If you have questions about a medical condition or this instruction, always ask your healthcare professional. [employer] disclaims any warranty or liability for your use of this information.

## 2024-10-22 NOTE — PROGRESS NOTES
Prior to immunization administration, verified patients identity using patient s name and date of birth. Please see Immunization Activity for additional information.     Is the patient's temperature normal (100.5 or less)? {:206904}      Patient instructed to remain in clinic for {:[id number]} minutes afterwards, and to report any adverse reactions.      Link to Ancillary Visit Immunization Standing Orders SmartSet     Screening performed by Roland Orozco MA on 10/22/2024 at 11:52 AM.

## 2024-12-05 ENCOUNTER — MYC MEDICAL ADVICE (OUTPATIENT)
Dept: FAMILY MEDICINE | Facility: CLINIC | Age: 64
End: 2024-12-05
Payer: COMMERCIAL

## 2024-12-05 DIAGNOSIS — N40.1 BENIGN NON-NODULAR PROSTATIC HYPERPLASIA WITH LOWER URINARY TRACT SYMPTOMS: Primary | ICD-10-CM

## 2024-12-05 NOTE — TELEPHONE ENCOUNTER
Sourav Herr --    Please review and advise: tadalafil.     Office visit note: 10/22/2024:  (N40.1) Benign non-nodular prostatic hyperplasia with lower urinary tract symptoms  Comment:   Plan: stop tadalafil at this time, he will update me in 3-4 weeks with sxs.  If an increase in nocturia, consider tamsulosin.    MyChart message from patient:   I did stop taking tadalfil as we discussed at my annual appt in Oct. The frequent urination seems the same with or without that medication. What other medication would you recommend to help with my frequent urination given my previous med history?     Patient Writer responded via [employer].    ANNY BoyleN RN  [facility]

## 2024-12-09 RX ORDER — TAMSULOSIN HYDROCHLORIDE 0.4 MG/1
0.4 CAPSULE ORAL DAILY
Qty: 90 CAPSULE | Refills: 1 | Status: SHIPPED | OUTPATIENT
Start: 2024-12-09

## 2024-12-18 ENCOUNTER — MYC MEDICAL ADVICE (OUTPATIENT)
Dept: SLEEP MEDICINE | Facility: CLINIC | Age: 64
End: 2024-12-18
Payer: COMMERCIAL

## 2024-12-20 ASSESSMENT — SLEEP AND FATIGUE QUESTIONNAIRES
HOW LIKELY ARE YOU TO NOD OFF OR FALL ASLEEP WHILE WATCHING TV: HIGH CHANCE OF DOZING
HOW LIKELY ARE YOU TO NOD OFF OR FALL ASLEEP WHEN YOU ARE A PASSENGER IN A CAR FOR AN HOUR WITHOUT A BREAK: MODERATE CHANCE OF DOZING
HOW LIKELY ARE YOU TO NOD OFF OR FALL ASLEEP IN A CAR, WHILE STOPPED FOR A FEW MINUTES IN TRAFFIC: WOULD NEVER DOZE
HOW LIKELY ARE YOU TO NOD OFF OR FALL ASLEEP WHILE SITTING QUIETLY AFTER LUNCH WITHOUT ALCOHOL: MODERATE CHANCE OF DOZING
HOW LIKELY ARE YOU TO NOD OFF OR FALL ASLEEP WHILE SITTING INACTIVE IN A PUBLIC PLACE: SLIGHT CHANCE OF DOZING
HOW LIKELY ARE YOU TO NOD OFF OR FALL ASLEEP WHILE SITTING AND READING: MODERATE CHANCE OF DOZING
HOW LIKELY ARE YOU TO NOD OFF OR FALL ASLEEP WHILE LYING DOWN TO REST IN THE AFTERNOON WHEN CIRCUMSTANCES PERMIT: MODERATE CHANCE OF DOZING
HOW LIKELY ARE YOU TO NOD OFF OR FALL ASLEEP WHILE SITTING AND TALKING TO SOMEONE: WOULD NEVER DOZE

## 2024-12-23 ENCOUNTER — VIRTUAL VISIT (OUTPATIENT)
Dept: SLEEP MEDICINE | Facility: CLINIC | Age: 64
End: 2024-12-23
Payer: COMMERCIAL

## 2024-12-23 VITALS — HEIGHT: 72 IN | WEIGHT: 199 LBS | BODY MASS INDEX: 26.95 KG/M2

## 2024-12-23 DIAGNOSIS — F51.04 PSYCHOPHYSIOLOGICAL INSOMNIA: ICD-10-CM

## 2024-12-23 DIAGNOSIS — G47.52 RBD (REM BEHAVIORAL DISORDER): Primary | ICD-10-CM

## 2024-12-23 PROCEDURE — 99215 OFFICE O/P EST HI 40 MIN: CPT | Mod: 95 | Performed by: PSYCHIATRY & NEUROLOGY

## 2024-12-23 ASSESSMENT — PATIENT HEALTH QUESTIONNAIRE - PHQ9: SUM OF ALL RESPONSES TO PHQ QUESTIONS 1-9: 11

## 2024-12-23 ASSESSMENT — PAIN SCALES - GENERAL: PAINLEVEL_OUTOF10: NO PAIN (0)

## 2024-12-23 NOTE — NURSING NOTE
PHQ9 = 11    Depression Response    Patient completed the PHQ-9 assessment for depression and scored >9? Yes  Question 9 on the PHQ-9 was positive for suicidality? No  Does patient have current mental health provider? Yes    Is this a virtual visit? Yes   Does patient have suicidal ideation (positive question 9)? No - offer to place Mental Health Referral.  Referral order pended    I personally notified the following: visit provider        Current patient location: [address] [place] [address]    Is the patient currently in the state [facility]? YES    Visit mode:VIDEO    If the visit is dropped, the patient can be reconnected by:VIDEO VISIT: Send to e-mail at: [email or web address]    Will anyone else be joining the visit? NO  (If patient encounters technical issues they should call [phone]    Are changes needed to the allergy or medication list? No    Are refills needed on medications prescribed by this physician? NO    Rooming Documentation:  Questionnaire(s) completed    Reason for visit: RECHECK    Bina CURRIE

## 2024-12-23 NOTE — PROGRESS NOTES
Virtual Visit Details    Type of service:  Video Visit     Originating Location (pt. Location): Home    Distant Location (provider location):  On-site  Platform used for Video Visit: AmWell    Brief sleep staff note    Challenging 5-HT RBD (originally SSRi now most recently exacerbate by bupropion in the Spring and then later by dextromethorphan/bupropion in the Summer/Fall).  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     He has contracted for safety and indicated that he would not hurt himself or others.      His dream enactment at this time is mild, no recent concerns for injury to himself or his wife.      We have him on melatonin 3mg which may have helped but also may have led to more of a circadian advance.   Because of this we discussed our options and came up with the following plan.     Stop melatonin now, Mychart me if concerning RENUKA emerges.   Continue to see Dr Maldonado for CBT-I  Continue to work with Psychiatry for medication resistant depression  If RENUKA persists we could consider clonazepam but would need to be careful regarding KELVIN (would consider HSAT on clonazepam) and possibly worsening his depression.     He will follow up with me in late February.     He agrees with the plan.     All questions were answered.    It is a great privilege being asked to participate in this patients care.  The patient has been advised on the importance in of never operating operating a motor vehicle while tired or sleepy.        I visited with the patient directly but also extensively reviewed chart and coordinated care. Total time spent in the care of this patient today (Face-to-Face time + chart time, , and coordination of care) was greater than 40 minutes.

## 2025-01-09 ASSESSMENT — SLEEP AND FATIGUE QUESTIONNAIRES
HOW LIKELY ARE YOU TO NOD OFF OR FALL ASLEEP WHILE WATCHING TV: MODERATE CHANCE OF DOZING
HOW LIKELY ARE YOU TO NOD OFF OR FALL ASLEEP WHILE SITTING QUIETLY AFTER LUNCH WITHOUT ALCOHOL: MODERATE CHANCE OF DOZING
HOW LIKELY ARE YOU TO NOD OFF OR FALL ASLEEP WHILE LYING DOWN TO REST IN THE AFTERNOON WHEN CIRCUMSTANCES PERMIT: MODERATE CHANCE OF DOZING
HOW LIKELY ARE YOU TO NOD OFF OR FALL ASLEEP WHILE SITTING INACTIVE IN A PUBLIC PLACE: MODERATE CHANCE OF DOZING
HOW LIKELY ARE YOU TO NOD OFF OR FALL ASLEEP WHILE SITTING AND READING: MODERATE CHANCE OF DOZING
HOW LIKELY ARE YOU TO NOD OFF OR FALL ASLEEP IN A CAR, WHILE STOPPED FOR A FEW MINUTES IN TRAFFIC: WOULD NEVER DOZE
HOW LIKELY ARE YOU TO NOD OFF OR FALL ASLEEP WHILE SITTING AND TALKING TO SOMEONE: WOULD NEVER DOZE
HOW LIKELY ARE YOU TO NOD OFF OR FALL ASLEEP WHEN YOU ARE A PASSENGER IN A CAR FOR AN HOUR WITHOUT A BREAK: HIGH CHANCE OF DOZING

## 2025-01-10 ENCOUNTER — VIRTUAL VISIT (OUTPATIENT)
Dept: SLEEP MEDICINE | Facility: CLINIC | Age: 65
End: 2025-01-10
Payer: COMMERCIAL

## 2025-01-10 VITALS — HEIGHT: 72 IN | BODY MASS INDEX: 26.75 KG/M2 | WEIGHT: 197.5 LBS

## 2025-01-10 DIAGNOSIS — F51.04 CHRONIC INSOMNIA: Primary | ICD-10-CM

## 2025-01-10 DIAGNOSIS — F33.1 MAJOR DEPRESSIVE DISORDER, RECURRENT, MODERATE (H): ICD-10-CM

## 2025-01-10 DIAGNOSIS — G47.30 MILD SLEEP APNEA: ICD-10-CM

## 2025-01-10 DIAGNOSIS — G47.52 RBD (REM BEHAVIORAL DISORDER): ICD-10-CM

## 2025-01-10 PROCEDURE — 90832 PSYTX W PT 30 MINUTES: CPT | Mod: 95 | Performed by: PSYCHOLOGIST

## 2025-01-10 ASSESSMENT — PATIENT HEALTH QUESTIONNAIRE - PHQ9: SUM OF ALL RESPONSES TO PHQ QUESTIONS 1-9: 15

## 2025-01-10 ASSESSMENT — PAIN SCALES - GENERAL: PAINLEVEL_OUTOF10: NO PAIN (0)

## 2025-01-10 NOTE — PROGRESS NOTES
Virtual Visit Details    Type of service:  Video Visit     Originating Location (pt. Location): Home    Distant Location (provider location):  On-site  Platform used for Video Visit: [employer]ark Start Time: 2:03 PM  Visit End Time: 2:25 PM        SLEEP MEDICINE VIRTUAL FOLLOW-UP VISIT  Behavioral Sleep Medicine    Patient Name: Tariq Perez  MRN:  [id number]  Date of Service: Mina 10, 2025       Subjective Report     Tariq Perez  returns for a telehealth video visit to discuss progress in implementing behavioral strategies for the management of insomnia and who today is seeking .  Patient consent for initiation of video visit was obtained and documented prior to initiation of visit.     Tariq reports his depressive symptoms have maintained in the moderately severe depression without suicidal ideation.  .  He reports that he is adhering to recommended behavioral sleep changes including maintaining a consistent and somewhat compressed sleep schedule and also following recommendations of sleep neurologist, Dr. Uerña follow-up with respect to treating REM behavior disorder.  Patient reports significant reduction in REM related behaviors at night.  Overall sleep latency and sleep consolidation have improved.    However, after seeing his psychiatrist, Dr. Bibi Skinner last visit, he states that she recommended he be seen by psychiatrist with specialty and treating treatment resistant depression.  We discussed options for referral within [facility] and also comprehensive program offered at [facility].           Sleep Data:     Source of Sleep Estimates:  Verbal Self-report    [facility] Niranjan Lundberg MD:    Challenging 5-HT RBD (originally SSRi now most recently exacerbate by bupropion in the Spring and then later by dextromethorphan/bupropion in the Summer/Fall).  Currently off of antidepressants and is working with his psychiatrist for a referral for  medication resistant depression which I think is an excellent idea.      He has contracted for safety and indicated that he would not hurt himself or others.       His dream enactment at this time is mild, no recent concerns for injury to himself or his wife.       We have him on melatonin 3mg which may have helped but also may have led to more of a circadian advance.   Because of this we discussed our options and came up with the following plan.      Stop melatonin now, Mychart me if concerning RENUKA emerges.   Continue to see Dr Maldonado for CBT-I  Continue to work with Psychiatry for medication resistant depression    EPWORTH SLEEPINESS SCALE    Sitting and reading  2   Watching TV  2   Sitting, inactive in a public place (theatre or mtg.)  2    As a passenger in a car  3   Lying down to rest in the afternoon when circumstance permit  2   Sitting and talking to someone  0   Sitting quietly after lunch without alcohol  2   In a car, while stopped for a few minutes in traffic  0   TOTAL SCORE (nl <11)  13     INSOMNIA SEVERITY INDEX     Difficulty falling asleep  1   Difficult staying asleep  2   Problems waking up to early  4   How SATISFIED/DISSATISFIED are you with your CURRENT sleep pattern?  4   How NOTICEABLE to others do you think your sleep pattern is in terms of your quality of life?  2   How WORRIED/DISTRESSED are you about your current sleep pattern?  4   To what extent do you consider your sleep problem to INTERFERE with your daily fuctioning(e.g. daytime fatigue, mood, ability to function at work/daily chores, concentration, mood,etc.) CURRENTLY?  4   INSOMNIA SEVERITY INDEX TOTAL SCORE  21    Absence of insomnia (0-7); sub-threshold insomnia (8-14); moderate insomnia (15-21); and severe insomnia (22-28)       Interventions     Strategies and recommendations including Stimulus control therapy, Sleep compression therapy, and Depression and sleep were reviewed and discussed today.     Services provided are  compliant with the requirements of [place] Statute SS 256B.0625 Subd. 3b and paragraph (b)       Vital Signs     There were no vitals taken for this visit.     Mental Status     Orientation:  X3  Mood:  depressed  Affect:  Congruent with mood  Speech/Language:  Normal  Thought Process: Intact  Associations:  Normal  Thought Content: Normal  Patient does not report any suicidal ideation, intention or plan.    Diagnostic Impressions and Plan        Chronic insomnia  Mild sleep apnea  RBD (REM behavioral disorder)    Patient reports very good adherence to recommended cognitive behavioral therapy strategies.  He is noting reduction in sleep latency and improved sleep consolidation.  He also is noting significant reduction in RBD behavior.  His primary complaint today was seeking referral for treatment resistant depression.    Plan:  Continue current sleep schedule and plan , continue to follow with Dr. Niranjan Lundberg for management of RBD, referral placed to adult mental health for psychiatric med and medication management and treatment resistant depression program options.    Follow-up: as needed      Niranjan Maldonado PsyD, LP, DBSM  Diplomate, Behavioral Sleep Medicine  [facility]      Note: This dictation was created using voice recognition software. This document may contain an error not identified before finalizing the document. If the error changes the accuracy of the document, I would appreciate it being brought to my attention.

## 2025-01-10 NOTE — NURSING NOTE
Current patient location: [address] [place] [address]    Is the patient currently in the state of [place]? YES    Visit mode: VIDEO    If the visit is dropped, the patient can be reconnected by:VIDEO VISIT: Send to e-mail at: [email or web address]    Will anyone else be joining the visit? NO  (If patient encounters technical issues they should call [phone])    Are changes needed to the allergy or medication list? Pt stated no changes to allergies and Pt stated no med changes    Are refills needed on medications prescribed by this physician? NO    Rooming Documentation:  Questionnaire(s) completed  Depression Response    Patient completed the PHQ-9 assessment for depression and scored >9? Yes-15  Question 9 on the PHQ-9 was positive for suicidality? No  Does patient have current mental health provider? Yes    Is this a virtual visit? Yes   Does patient have suicidal ideation (positive question 9)? No - offer to place Mental Health Referral.  Referral order pended    I personally notified the following: visit provider           Reason for visit: RUSLAN CURRIE

## (undated) DEVICE — TUBING SUCTION 12"X1/4" N612

## (undated) DEVICE — SOL WATER IRRIG 500ML BOTTLE 2F7113

## (undated) DEVICE — SPECIMEN CONTAINER 3OZ W/FORMALIN 59901

## (undated) DEVICE — SUCTION MANIFOLD NEPTUNE 2 SYS 1 PORT 702-025-000

## (undated) DEVICE — KIT ENDO FIRST STEP DISINFECTANT 200ML W/POUCH EP-4

## (undated) DEVICE — SNARE CAPIVATOR ROUND COLD SNR BX10 M00561101

## (undated) DEVICE — GOWN IMPERVIOUS 2XL BLUE

## (undated) DEVICE — KIT ENDO TURNOVER/PROCEDURE CARRY-ON 101822